# Patient Record
Sex: MALE | Race: WHITE | NOT HISPANIC OR LATINO | Employment: UNEMPLOYED | URBAN - NONMETROPOLITAN AREA
[De-identification: names, ages, dates, MRNs, and addresses within clinical notes are randomized per-mention and may not be internally consistent; named-entity substitution may affect disease eponyms.]

---

## 2017-12-31 ENCOUNTER — HOSPITAL ENCOUNTER (EMERGENCY)
Facility: HOSPITAL | Age: 32
Discharge: HOME | End: 2017-12-31
Attending: FAMILY MEDICINE | Admitting: EMERGENCY MEDICINE

## 2017-12-31 VITALS
BODY MASS INDEX: 28.7 KG/M2 | OXYGEN SATURATION: 99 % | RESPIRATION RATE: 18 BRPM | SYSTOLIC BLOOD PRESSURE: 131 MMHG | HEIGHT: 71 IN | TEMPERATURE: 98.2 F | DIASTOLIC BLOOD PRESSURE: 73 MMHG | HEART RATE: 82 BPM | WEIGHT: 205 LBS

## 2017-12-31 DIAGNOSIS — L25.5 DERMATITIS DUE TO PLANTS, INCLUDING POISON IVY, SUMAC, AND OAK: Primary | ICD-10-CM

## 2017-12-31 PROCEDURE — 25010000002 TRIAMCINOLONE PER 10 MG: Performed by: PHYSICIAN ASSISTANT

## 2017-12-31 PROCEDURE — 96372 THER/PROPH/DIAG INJ SC/IM: CPT

## 2017-12-31 PROCEDURE — 99282 EMERGENCY DEPT VISIT SF MDM: CPT

## 2017-12-31 RX ORDER — TRIAMCINOLONE ACETONIDE 40 MG/ML
40 INJECTION, SUSPENSION INTRA-ARTICULAR; INTRAMUSCULAR ONCE
Status: COMPLETED | OUTPATIENT
Start: 2017-12-31 | End: 2017-12-31

## 2017-12-31 RX ORDER — HYDROXYZINE HYDROCHLORIDE 25 MG/1
25 TABLET, FILM COATED ORAL 3 TIMES DAILY PRN
Qty: 21 TABLET | Refills: 0 | Status: SHIPPED | OUTPATIENT
Start: 2017-12-31 | End: 2018-01-07

## 2017-12-31 RX ADMIN — TRIAMCINOLONE ACETONIDE 40 MG: 40 INJECTION, SUSPENSION INTRA-ARTICULAR; INTRAMUSCULAR at 19:50

## 2018-01-01 NOTE — ED PROVIDER NOTES
Subjective   HPI Comments: Patient presents to emergency department for rash x 3 days.  States he cuts wood and is sure that is is poison oak.  States it is on his left neck and all around groin.  States it itches intensely and has spread to where he has itched lesions.  States he received a steroid injection last time he had it which resolved symptoms.        Patient is a 32 y.o. male presenting with rash.   History provided by:  Patient   used: No    Rash   Location:  Head/neck  Head/neck rash location:  Head and L neck  Quality: blistering, itchiness and weeping    Severity:  Mild  Onset quality:  Sudden  Duration:  3 days  Timing:  Constant  Progression:  Spreading  Chronicity:  Recurrent  Context: plant contact    Relieved by:  Nothing  Worsened by:  Heat  Ineffective treatments:  None tried  Associated symptoms: no abdominal pain, no diarrhea, no fever, no headaches, no joint pain, no nausea, no shortness of breath and not vomiting        Review of Systems   Constitutional: Negative for chills and fever.   Respiratory: Negative for chest tightness and shortness of breath.    Cardiovascular: Negative for chest pain.   Gastrointestinal: Negative for abdominal pain, constipation, diarrhea, nausea and vomiting.   Endocrine: Negative for polyuria.   Genitourinary: Negative for decreased urine volume, difficulty urinating, dysuria, flank pain, frequency and hematuria.   Musculoskeletal: Negative for arthralgias, back pain and neck pain.   Skin: Positive for rash. Negative for color change, pallor and wound.   Allergic/Immunologic: Negative for immunocompromised state.   Neurological: Negative for dizziness, syncope, weakness and headaches.   Hematological: Does not bruise/bleed easily.   Psychiatric/Behavioral: Negative for confusion, self-injury and suicidal ideas. The patient is not nervous/anxious.        No past medical history on file.    No Known Allergies    No past surgical history on  "file.    No family history on file.    Social History     Social History   • Marital status:      Spouse name: N/A   • Number of children: N/A   • Years of education: N/A     Social History Main Topics   • Smoking status: Not on file   • Smokeless tobacco: Not on file   • Alcohol use Not on file   • Drug use: Not on file   • Sexual activity: Not on file     Other Topics Concern   • Not on file     Social History Narrative           Objective      /73 (BP Location: Right arm, Patient Position: Sitting)  Pulse 82  Temp 98.2 °F (36.8 °C) (Oral)   Resp 18  Ht 180.3 cm (71\")  Wt 93 kg (205 lb)  SpO2 99%  BMI 28.59 kg/m2    Physical Exam   Constitutional: He is oriented to person, place, and time. He appears well-developed and well-nourished. No distress.   Cardiovascular: Normal rate, regular rhythm and normal heart sounds.    Pulmonary/Chest: Effort normal and breath sounds normal.   Abdominal: Soft. Bowel sounds are normal.   Neurological: He is alert and oriented to person, place, and time.   Skin: Skin is warm and dry.   Erythematous maculopapular rash with excoriations on scalp, left neck, and groin/inner thigh.   Psychiatric: He has a normal mood and affect. His behavior is normal. Thought content normal.   Nursing note and vitals reviewed.      Procedures         ED Course  ED Course                  MDM    Final diagnoses:   Dermatitis due to plants, including poison ivy, sumac, and oak            Mathew Castillo PA-C  01/01/18 1222    "

## 2018-01-29 ENCOUNTER — OFFICE VISIT (OUTPATIENT)
Dept: PODIATRY | Facility: CLINIC | Age: 33
End: 2018-01-29

## 2018-01-29 VITALS
SYSTOLIC BLOOD PRESSURE: 125 MMHG | HEART RATE: 73 BPM | HEIGHT: 71 IN | BODY MASS INDEX: 28.53 KG/M2 | WEIGHT: 203.8 LBS | DIASTOLIC BLOOD PRESSURE: 76 MMHG | OXYGEN SATURATION: 96 %

## 2018-01-29 DIAGNOSIS — M79.671 FOOT PAIN, RIGHT: Primary | ICD-10-CM

## 2018-01-29 DIAGNOSIS — M19.171 POST-TRAUMATIC OSTEOARTHRITIS OF RIGHT FOOT: ICD-10-CM

## 2018-01-29 DIAGNOSIS — S92.011S CLOSED DISPLACED FRACTURE OF BODY OF RIGHT CALCANEUS, SEQUELA: ICD-10-CM

## 2018-01-29 PROCEDURE — 99204 OFFICE O/P NEW MOD 45 MIN: CPT | Performed by: PODIATRIST

## 2018-01-29 RX ORDER — GABAPENTIN 300 MG/1
300 CAPSULE ORAL 2 TIMES DAILY
Qty: 60 CAPSULE | Refills: 0 | Status: SHIPPED | OUTPATIENT
Start: 2018-01-29 | End: 2018-02-26 | Stop reason: SDUPTHER

## 2018-01-29 RX ORDER — MELOXICAM 15 MG/1
15 TABLET ORAL DAILY
Qty: 30 TABLET | Refills: 0 | Status: SHIPPED | OUTPATIENT
Start: 2018-01-29 | End: 2018-02-26 | Stop reason: SDUPTHER

## 2018-01-29 NOTE — PROGRESS NOTES
Devaughn Rodriguez  1985  32 y.o. male   Patient presents today for right foot pain and numbness.    01/29/2018    Chief Complaint   Patient presents with   • Right Foot - Pain           History of Present Illness    Devaughn Rodriguez is a 32 y.o.male who presents to clinic with chief complaint of right foot pain.  Patient states that approximately 1 year ago she fell off a roof and fractured his right calcaneus.  He was in [place] when this happened.  He was treated nonoperatively by a physician in [place].  He relates to continued pain to his right foot.  He currently rates as a 5 out of 10.  He describes the pain as a sharp.  He also relates to numbness to the inside of his right foot.  He is currently ambulating in boots with moderate discomfort.  He takes over-the-counter Advil liquid gels for the pain.  He has no other pedal complaints.          History reviewed. No pertinent past medical history.      History reviewed. No pertinent surgical history.      Family History   Problem Relation Age of Onset   • No Known Problems Mother    • No Known Problems Father    • No Known Problems Brother    • Cancer Paternal Grandmother    • Cancer Paternal Grandfather        No Known Allergies    Social History     Social History   • Marital status:      Spouse name: N/A   • Number of children: N/A   • Years of education: N/A     Occupational History   • Not on file.     Social History Main Topics   • Smoking status: Former Smoker     Types: Cigarettes   • Smokeless tobacco: Former User     Types: Chew   • Alcohol use No   • Drug use: No   • Sexual activity: Not on file     Other Topics Concern   • Not on file     Social History Narrative   • No narrative on file         Current Outpatient Prescriptions   Medication Sig Dispense Refill   • gabapentin (NEURONTIN) 300 MG capsule Take 1 capsule by mouth 2 (Two) Times a Day. 60 capsule 0   • meloxicam (MOBIC) 15 MG tablet Take 1 tablet by mouth Daily. Take once  "daily. 30 tablet 0     No current facility-administered medications for this visit.          OBJECTIVE    /76  Pulse 73  Ht 180.3 cm (70.98\")  Wt 92.4 kg (203 lb 12.8 oz)  SpO2 96%  BMI 28.44 kg/m2      Review of Systems   Constitutional: Negative.    HENT: Negative.    Eyes: Negative.    Respiratory: Negative.    Cardiovascular: Negative.    Gastrointestinal: Negative.    Endocrine: Negative.    Genitourinary: Negative.    Musculoskeletal:        Foot pain, ankle pain   Skin: Negative.    Allergic/Immunologic: Negative.    Neurological: Negative.    Hematological: Negative.    Psychiatric/Behavioral: Negative.          Constitutional: well developed, well nourished    HEENT: Normocephalic and atraumatic, normal hearing    Respiratory: Non labored respirations noted    Right lower extremity exam    Cardiovascular:    DP/PT pulses palpable    CFT brisk  to all digits  Skin temp is warm to warm from proximal tibia to distal digits  Pedal hair growth present.   No erythema  noted   Nonpitting edema noted to the right lower extremity    Musculoskeletal:  Muscle strength is 5/5 for plantar flexors and inverters on the right and 4 out of 5 for dorsiflexors and everters on the right.  ROM of the 1st MTP is full without pain or crepitus  ROM of the MTJ is full without pain or crepitus    ROM of the STJ is decreased with pain and crepitus     ROM of the ankle joint is full without pain or crepitus    Pain on palpation to the sinus tarsi  Hindfoot inversion noted    Dermatological:   Skin is warm, dry and intact    Webspaces 1-4 bilateral are clean, dry and intact.   No subcutaneous nodules or masses noted    No open wounds noted     Neurological:   Protective sensation decreased to the great toe and medial arch  Sensation intact to light touch    DTR intact    Psychiatric: A&O x 3 with normal mood and affect. NAD.     Radiographs: 3 views of the right foot were obtained today.  Healing/healed calcaneal " intra-articular calcaneal fracture noted.  Significant joint degeneration noted to the subtalar joint. Decrease to bohlers angle.         Procedures        ASSESSMENT AND PLAN    Devaughn was seen today for pain.    Diagnoses and all orders for this visit:    Foot pain, right  -     XR Foot 3+ View Right    Post-traumatic osteoarthritis of right foot    Closed displaced fracture of body of right calcaneus, sequela    Other orders  -     meloxicam (MOBIC) 15 MG tablet; Take 1 tablet by mouth Daily. Take once daily.  -     gabapentin (NEURONTIN) 300 MG capsule; Take 1 capsule by mouth 2 (Two) Times a Day.    - Comprehensive foot and ankle exam performed.   - X-rays taken and reviewed  - Lengthy discussion was held patient regarding conservative and surgical treatment for his right foot pain.  - Rx for Mobic and gabapentin  - Dispensed lace up ankle brace  - Patient is aware that he will likely need primary subtalar joint arthrodesis in the future  - He can work as tolerated. He will likely have increased pain with certain activities like climbing ladders or hopping up and down out of truck beds.   - All questions were answered to the patients satisfaction.  - RTC 4 weeks    I spent 45 minutes face-to-face with this patient discussing his complaint and potential treatment options.          This document has been electronically signed by Junaid Avery DPM on January 29, 2018 6:07 PM     1/29/2018  6:07 PM

## 2018-02-09 ENCOUNTER — HOSPITAL ENCOUNTER (EMERGENCY)
Facility: HOSPITAL | Age: 33
Discharge: HOME | End: 2018-02-09
Attending: EMERGENCY MEDICINE | Admitting: EMERGENCY MEDICINE

## 2018-02-09 VITALS
RESPIRATION RATE: 18 BRPM | TEMPERATURE: 98.1 F | HEIGHT: 71 IN | WEIGHT: 205 LBS | SYSTOLIC BLOOD PRESSURE: 159 MMHG | DIASTOLIC BLOOD PRESSURE: 77 MMHG | OXYGEN SATURATION: 97 % | HEART RATE: 74 BPM | BODY MASS INDEX: 28.7 KG/M2

## 2018-02-09 DIAGNOSIS — J01.00 ACUTE NON-RECURRENT MAXILLARY SINUSITIS: Primary | ICD-10-CM

## 2018-02-09 DIAGNOSIS — H65.03 BILATERAL ACUTE SEROUS OTITIS MEDIA, RECURRENCE NOT SPECIFIED: ICD-10-CM

## 2018-02-09 PROCEDURE — 99283 EMERGENCY DEPT VISIT LOW MDM: CPT

## 2018-02-09 RX ORDER — CEFUROXIME AXETIL 500 MG/1
500 TABLET ORAL ONCE
Status: COMPLETED | OUTPATIENT
Start: 2018-02-09 | End: 2018-02-09

## 2018-02-09 RX ORDER — CETIRIZINE HYDROCHLORIDE, PSEUDOEPHEDRINE HYDROCHLORIDE 5; 120 MG/1; MG/1
1 TABLET, FILM COATED, EXTENDED RELEASE ORAL 2 TIMES DAILY PRN
Qty: 14 TABLET | Refills: 0 | Status: SHIPPED | OUTPATIENT
Start: 2018-02-09 | End: 2018-07-23

## 2018-02-09 RX ORDER — CEFPROZIL 500 MG/1
500 TABLET, FILM COATED ORAL 2 TIMES DAILY
Qty: 20 TABLET | Refills: 0 | Status: SHIPPED | OUTPATIENT
Start: 2018-02-09 | End: 2018-02-19

## 2018-02-09 RX ADMIN — CEFUROXIME AXETIL 500 MG: 500 TABLET ORAL at 22:10

## 2018-02-10 NOTE — ED PROVIDER NOTES
Subjective   Patient is a 32 y.o. male presenting with URI.   URI   Presenting symptoms: congestion, ear pain, fever, rhinorrhea and sore throat    Severity:  Moderate  Onset quality:  Gradual  Duration:  1 week  Timing:  Constant  Progression:  Worsening  Chronicity:  New  Relieved by:  Nothing  Worsened by:  Nothing  Ineffective treatments:  None tried  Associated symptoms: headaches and sinus pain    Risk factors: no immunosuppression        Review of Systems   Constitutional: Positive for fever.   HENT: Positive for congestion, ear pain, rhinorrhea, sinus pain and sore throat.    Neurological: Positive for headaches.   All other systems reviewed and are negative.      History reviewed. No pertinent past medical history.    No Known Allergies    History reviewed. No pertinent surgical history.    Family History   Problem Relation Age of Onset   • No Known Problems Mother    • No Known Problems Father    • No Known Problems Brother    • Cancer Paternal Grandmother    • Cancer Paternal Grandfather        Social History     Social History   • Marital status:      Spouse name: N/A   • Number of children: N/A   • Years of education: N/A     Social History Main Topics   • Smoking status: Former Smoker     Types: Cigarettes   • Smokeless tobacco: Former User     Types: Chew   • Alcohol use No   • Drug use: No   • Sexual activity: Not Asked     Other Topics Concern   • None     Social History Narrative           Objective   Physical Exam   Constitutional: He is oriented to person, place, and time. He appears well-developed and well-nourished.   HENT:   Head: Normocephalic and atraumatic.   Right Ear: A middle ear effusion is present.   Left Ear: A middle ear effusion is present.   Nose: Rhinorrhea present. Right sinus exhibits maxillary sinus tenderness.   Eyes: Conjunctivae and EOM are normal. Pupils are equal, round, and reactive to light.   Neck: Neck supple. No JVD present. No tracheal deviation present.    Cardiovascular: Normal rate, regular rhythm, normal heart sounds and intact distal pulses.  Exam reveals no gallop and no friction rub.    No murmur heard.  Pulmonary/Chest: Effort normal and breath sounds normal. He has no wheezes. He has no rales.   Abdominal: Soft. Bowel sounds are normal. There is no tenderness. There is no rebound and no guarding.   Lymphadenopathy:     He has no cervical adenopathy.   Neurological: He is alert and oriented to person, place, and time.   Skin: Skin is warm and dry.   Nursing note and vitals reviewed.      Procedures         ED Course  ED Course                  MDM    Final diagnoses:   Acute non-recurrent maxillary sinusitis   Bilateral acute serous otitis media, recurrence not specified            Ralph Shelton MD  02/09/18 1963

## 2018-02-10 NOTE — DISCHARGE INSTRUCTIONS
Return ED fever, vomiting, worse condition, other concerns    Hypertension  Hypertension, commonly called high blood pressure, is when the force of blood pumping through your arteries is too strong. Your arteries are the blood vessels that carry blood from your heart throughout your body. A blood pressure reading consists of a higher number over a lower number, such as 110/72. The higher number (systolic) is the pressure inside your arteries when your heart pumps. The lower number (diastolic) is the pressure inside your arteries when your heart relaxes. Ideally you want your blood pressure below 120/80.  Hypertension forces your heart to work harder to pump blood. Your arteries may become narrow or stiff. Having untreated or uncontrolled hypertension can cause heart attack, stroke, kidney disease, and other problems.  What increases the risk?  Some risk factors for high blood pressure are controllable. Others are not.  Risk factors you cannot control include:  · Race. You may be at higher risk if you are .  · Age. Risk increases with age.  · Gender. Men are at higher risk than women before age 45 years. After age 65, women are at higher risk than men.  Risk factors you can control include:  · Not getting enough exercise or physical activity.  · Being overweight.  · Getting too much fat, sugar, calories, or salt in your diet.  · Drinking too much alcohol.  What are the signs or symptoms?  Hypertension does not usually cause signs or symptoms. Extremely high blood pressure (hypertensive crisis) may cause headache, anxiety, shortness of breath, and nosebleed.  How is this diagnosed?  To check if you have hypertension, your health care provider will measure your blood pressure while you are seated, with your arm held at the level of your heart. It should be measured at least twice using the same arm. Certain conditions can cause a difference in blood pressure between your right and left arms. A blood  pressure reading that is higher than normal on one occasion does not mean that you need treatment. If it is not clear whether you have high blood pressure, you may be asked to return on a different day to have your blood pressure checked again. Or, you may be asked to monitor your blood pressure at home for 1 or more weeks.  How is this treated?  Treating high blood pressure includes making lifestyle changes and possibly taking medicine. Living a healthy lifestyle can help lower high blood pressure. You may need to change some of your habits.  Lifestyle changes may include:  · Following the DASH diet. This diet is high in fruits, vegetables, and whole grains. It is low in salt, red meat, and added sugars.  · Keep your sodium intake below 2,300 mg per day.  · Getting at least 30-45 minutes of aerobic exercise at least 4 times per week.  · Losing weight if necessary.  · Not smoking.  · Limiting alcoholic beverages.  · Learning ways to reduce stress.  Your health care provider may prescribe medicine if lifestyle changes are not enough to get your blood pressure under control, and if one of the following is true:  · You are 18-59 years of age and your systolic blood pressure is above 140.  · You are 60 years of age or older, and your systolic blood pressure is above 150.  · Your diastolic blood pressure is above 90.  · You have diabetes, and your systolic blood pressure is over 140 or your diastolic blood pressure is over 90.  · You have kidney disease and your blood pressure is above 140/90.  · You have heart disease and your blood pressure is above 140/90.  Your personal target blood pressure may vary depending on your medical conditions, your age, and other factors.  Follow these instructions at home:  · Have your blood pressure rechecked as directed by your health care provider.  · Take medicines only as directed by your health care provider. Follow the directions carefully. Blood pressure medicines must be taken as  prescribed. The medicine does not work as well when you skip doses. Skipping doses also puts you at risk for problems.  · Do not smoke.  · Monitor your blood pressure at home as directed by your health care provider.  Contact a health care provider if:  · You think you are having a reaction to medicines taken.  · You have recurrent headaches or feel dizzy.  · You have swelling in your ankles.  · You have trouble with your vision.  Get help right away if:  · You develop a severe headache or confusion.  · You have unusual weakness, numbness, or feel faint.  · You have severe chest or abdominal pain.  · You vomit repeatedly.  · You have trouble breathing.  This information is not intended to replace advice given to you by your health care provider. Make sure you discuss any questions you have with your health care provider.  Document Released: 12/18/2006 Document Revised: 05/25/2017 Document Reviewed: 10/10/2014  [employer] Patient Education © 2017 [employer]

## 2018-02-12 ENCOUNTER — TELEPHONE (OUTPATIENT)
Dept: FAMILY MEDICINE CLINIC | Facility: CLINIC | Age: 33
End: 2018-02-12

## 2018-02-26 ENCOUNTER — OFFICE VISIT (OUTPATIENT)
Dept: PODIATRY | Facility: CLINIC | Age: 33
End: 2018-02-26

## 2018-02-26 VITALS — HEART RATE: 91 BPM | WEIGHT: 205 LBS | HEIGHT: 71 IN | BODY MASS INDEX: 28.7 KG/M2 | OXYGEN SATURATION: 98 %

## 2018-02-26 DIAGNOSIS — M19.171 POST-TRAUMATIC OSTEOARTHRITIS OF RIGHT FOOT: ICD-10-CM

## 2018-02-26 DIAGNOSIS — S92.011S CLOSED DISPLACED FRACTURE OF BODY OF RIGHT CALCANEUS, SEQUELA: Primary | ICD-10-CM

## 2018-02-26 PROCEDURE — 20605 DRAIN/INJ JOINT/BURSA W/O US: CPT | Performed by: PODIATRIST

## 2018-02-26 PROCEDURE — 99213 OFFICE O/P EST LOW 20 MIN: CPT | Performed by: PODIATRIST

## 2018-02-26 RX ORDER — GABAPENTIN 300 MG/1
300 CAPSULE ORAL 2 TIMES DAILY
Qty: 60 CAPSULE | Refills: 0 | Status: SHIPPED | OUTPATIENT
Start: 2018-02-26 | End: 2018-02-26 | Stop reason: SDUPTHER

## 2018-02-26 RX ORDER — GABAPENTIN 300 MG/1
300 CAPSULE ORAL 2 TIMES DAILY
Qty: 60 CAPSULE | Refills: 0 | Status: SHIPPED | OUTPATIENT
Start: 2018-02-26 | End: 2018-07-23

## 2018-02-26 RX ORDER — DEXAMETHASONE SODIUM PHOSPHATE 4 MG/ML
4 INJECTION, SOLUTION INTRA-ARTICULAR; INTRALESIONAL; INTRAMUSCULAR; INTRAVENOUS; SOFT TISSUE ONCE
Status: COMPLETED | OUTPATIENT
Start: 2018-02-26 | End: 2018-02-26

## 2018-02-26 RX ORDER — BUPIVACAINE HYDROCHLORIDE 5 MG/ML
1.5 INJECTION, SOLUTION EPIDURAL; INTRACAUDAL ONCE
Status: COMPLETED | OUTPATIENT
Start: 2018-02-26 | End: 2018-02-26

## 2018-02-26 RX ORDER — MELOXICAM 15 MG/1
15 TABLET ORAL DAILY
Qty: 30 TABLET | Refills: 0 | Status: SHIPPED | OUTPATIENT
Start: 2018-02-26 | End: 2018-03-24 | Stop reason: SDUPTHER

## 2018-02-26 RX ADMIN — DEXAMETHASONE SODIUM PHOSPHATE 4 MG: 4 INJECTION, SOLUTION INTRA-ARTICULAR; INTRALESIONAL; INTRAMUSCULAR; INTRAVENOUS; SOFT TISSUE at 14:43

## 2018-02-26 RX ADMIN — BUPIVACAINE HYDROCHLORIDE 1.5 ML: 5 INJECTION, SOLUTION EPIDURAL; INTRACAUDAL at 14:43

## 2018-02-26 NOTE — PROGRESS NOTES
Devaughn Hernández Igorleon  1985  32 y.o. male   Patient presents today for right foot pain and numbness.    02/26/2018      Chief Complaint   Patient presents with   • Right Foot - Follow-up           History of Present Illness    Patient presents to clinic today for follow-up of his right foot pain and numbness.  He states that the gabapentin has significantly reduced his nerve pain.  He has been wearing his lace up ankle brace which helps some.  He continues to have right foot pain aggravated with prolonged weightbearing.  Currently rates as a 4 out of 10.  He is interested in discussing surgical intervention.  He has no other pedal complaints.        Past Medical History:   Diagnosis Date   • Closed displaced fracture of body of right calcaneus with routine healing    • Foot pain, right    • Post-traumatic osteoarthritis of right foot          History reviewed. No pertinent surgical history.      Family History   Problem Relation Age of Onset   • No Known Problems Mother    • No Known Problems Father    • No Known Problems Brother    • Cancer Paternal Grandmother    • Cancer Paternal Grandfather        No Known Allergies    Social History     Social History   • Marital status:      Spouse name: N/A   • Number of children: N/A   • Years of education: N/A     Occupational History   • Not on file.     Social History Main Topics   • Smoking status: Former Smoker     Types: Cigarettes   • Smokeless tobacco: Former User     Types: Chew   • Alcohol use No   • Drug use: No   • Sexual activity: Not on file     Other Topics Concern   • Not on file     Social History Narrative         Current Outpatient Prescriptions   Medication Sig Dispense Refill   • cetirizine-pseudoephedrine (ZyrTEC-D) 5-120 MG per 12 hr tablet Take 1 tablet by mouth 2 (Two) Times a Day As Needed for Rhinitis. 14 tablet 0   • gabapentin (NEURONTIN) 300 MG capsule Take 1 capsule by mouth 2 (Two) Times a Day. 60 capsule 0   • meloxicam (MOBIC) 15  "MG tablet Take 1 tablet by mouth Daily. Take once daily. 30 tablet 0     No current facility-administered medications for this visit.          OBJECTIVE    Pulse 91  Ht 180.3 cm (71\")  Wt 93 kg (205 lb)  SpO2 98%  BMI 28.59 kg/m2      Review of Systems   Constitutional: Negative.  Negative for activity change.   HENT: Negative.  Negative for congestion.    Eyes: Negative.  Negative for discharge.   Respiratory: Negative.  Negative for apnea.    Cardiovascular: Negative.  Negative for chest pain.   Gastrointestinal: Negative.  Negative for abdominal distention.   Endocrine: Negative.  Negative for cold intolerance.   Genitourinary: Negative.  Negative for difficulty urinating.   Musculoskeletal:        Right foot pain  Ankle pain   Skin: Negative.  Negative for color change.   Allergic/Immunologic: Negative.  Negative for environmental allergies.   Neurological: Negative.  Negative for dizziness.   Hematological: Negative.  Negative for adenopathy.   Psychiatric/Behavioral: Negative.  Negative for agitation.         Constitutional: well developed, well nourished    HEENT: Normocephalic and atraumatic, normal hearing    Respiratory: Non labored respirations noted    Right lower extremity exam    Cardiovascular:    DP/PT pulses palpable    CFT brisk  to all digits  Skin temp is warm to warm from proximal tibia to distal digits  Pedal hair growth present.   No erythema  noted   Nonpitting edema noted to the right lower extremity    Musculoskeletal:  Muscle strength is 5/5 for plantar flexors and inverters on the right and 4 out of 5 for dorsiflexors and everters on the right.  ROM of the 1st MTP is full without pain or crepitus  ROM of the MTJ is full without pain or crepitus    ROM of the STJ is decreased with pain and crepitus     ROM of the ankle joint is full without pain or crepitus    Pain on palpation to the sinus tarsi  Hindfoot inversion noted    Dermatological:   Skin is warm, dry and intact    Webspaces " 1-4 bilateral are clean, dry and intact.   No subcutaneous nodules or masses noted    No open wounds noted     Neurological:   Protective sensation decreased to the great toe and medial arch  Sensation intact to light touch    DTR intact    Psychiatric: A&O x 3 with normal mood and affect. NAD.        Subtalar Joint Injection   Date/Time: 2/26/2018 3:30 PM  Consent given by: patient  Site marked: site marked  Timeout: Immediately prior to procedure a time out was called to verify the correct patient, procedure, equipment, support staff and site/side marked as required   Supporting Documentation  Indications: pain and diagnostic evaluation   Procedure Details  Location: subtalar joint.  Preparation: Patient was prepped and draped in the usual sterile fashion  Needle size: 25 G  Approach: anterolateral  Medication group details: Decadron 4 mg/mL, half percent Marcaine plain.  Patient tolerance: patient tolerated the procedure well with no immediate complications              ASSESSMENT AND PLAN    Devaughn was seen today for follow-up.    Diagnoses and all orders for this visit:    Closed displaced fracture of body of right calcaneus, sequela  -     XR Calcaneus 2+ View Right    Post-traumatic osteoarthritis of right foot  -     bupivacaine (PF) (MARCAINE) 0.5 % injection 1.5 mL; Inject 1.5 mL as directed 1 (One) Time.  -     dexamethasone (DECADRON) injection 4 mg; Inject 1 mL into the shoulder, thigh, or buttocks 1 (One) Time.    Other orders  -     meloxicam (MOBIC) 15 MG tablet; Take 1 tablet by mouth Daily. Take once daily.  -     Discontinue: gabapentin (NEURONTIN) 300 MG capsule; Take 1 capsule by mouth 2 (Two) Times a Day.  -     gabapentin (NEURONTIN) 300 MG capsule; Take 1 capsule by mouth 2 (Two) Times a Day.    - Discussion held patient regarding continued conservative care versus surgical intervention.  Patient is interested in pursuing surgical intervention.  - Diagnostic subtalar joint injection  performed.  Patient had immediate relief of symptoms.  - Refill mobile and gabapentin  - He can work as tolerated. He will likely have increased pain with certain activities like climbing ladders or hopping up and down out of truck beds.   - All his questions were answered.  - Return to clinic in 3 weeks          This document has been electronically signed by Junaid Avery DPM on February 26, 2018 3:29 PM     2/26/2018  3:29 PM

## 2018-03-26 RX ORDER — MELOXICAM 15 MG/1
TABLET ORAL
Qty: 30 TABLET | Refills: 0 | Status: SHIPPED | OUTPATIENT
Start: 2018-03-26 | End: 2018-04-10 | Stop reason: SDUPTHER

## 2018-04-10 ENCOUNTER — TELEPHONE (OUTPATIENT)
Dept: PODIATRY | Facility: CLINIC | Age: 33
End: 2018-04-10

## 2018-04-10 ENCOUNTER — OFFICE VISIT (OUTPATIENT)
Dept: PODIATRY | Facility: CLINIC | Age: 33
End: 2018-04-10

## 2018-04-10 VITALS — WEIGHT: 205 LBS | OXYGEN SATURATION: 98 % | HEART RATE: 70 BPM | HEIGHT: 71 IN | BODY MASS INDEX: 28.7 KG/M2

## 2018-04-10 DIAGNOSIS — M19.171 POST-TRAUMATIC OSTEOARTHRITIS OF RIGHT FOOT: ICD-10-CM

## 2018-04-10 DIAGNOSIS — S92.011S CLOSED DISPLACED FRACTURE OF BODY OF RIGHT CALCANEUS, SEQUELA: Primary | ICD-10-CM

## 2018-04-10 PROCEDURE — 99214 OFFICE O/P EST MOD 30 MIN: CPT | Performed by: PODIATRIST

## 2018-04-10 RX ORDER — BUPIVACAINE HCL/0.9 % NACL/PF 0.1 %
2 PLASTIC BAG, INJECTION (ML) EPIDURAL ONCE
Status: CANCELLED | OUTPATIENT
Start: 2018-07-26 | End: 2018-07-26

## 2018-04-10 RX ORDER — MELOXICAM 15 MG/1
15 TABLET ORAL DAILY
Qty: 30 TABLET | Refills: 0 | Status: SHIPPED | OUTPATIENT
Start: 2018-04-10 | End: 2018-05-15 | Stop reason: SDUPTHER

## 2018-04-10 NOTE — PROGRESS NOTES
Devaughn Hernández Michael  1985  32 y.o. male     Patient presents today for recheck of his right foot pain.    04/10/2018       Chief Complaint   Patient presents with   • Right Foot - Follow-up       History of Present Illness    Patient presents to clinic today for follow-up. He had diagnostic subtalar joint injection on his last visit for posttraumatic arthritis of his subtalar joint.  Patient states that he had 75% relief for approximately 2 weeks after the steroid injection.  He states the pain has returned.  Currently he rates it as a 3 out of 10.  Pain is aggravated with prolonged weightbearing and relieved with rest.  He has no other pedal complaints.      Past Medical History:   Diagnosis Date   • Closed displaced fracture of body of right calcaneus with routine healing    • Foot pain, right    • Post-traumatic osteoarthritis of right foot          History reviewed. No pertinent surgical history.      Family History   Problem Relation Age of Onset   • No Known Problems Mother    • No Known Problems Father    • No Known Problems Brother    • Cancer Paternal Grandmother    • Cancer Paternal Grandfather        No Known Allergies    Social History     Social History   • Marital status:      Spouse name: N/A   • Number of children: N/A   • Years of education: N/A     Occupational History   • Not on file.     Social History Main Topics   • Smoking status: Former Smoker     Types: Cigarettes   • Smokeless tobacco: Former User     Types: Chew   • Alcohol use No   • Drug use: No   • Sexual activity: Defer     Other Topics Concern   • Not on file     Social History Narrative   • No narrative on file         Current Outpatient Prescriptions   Medication Sig Dispense Refill   • cetirizine-pseudoephedrine (ZyrTEC-D) 5-120 MG per 12 hr tablet Take 1 tablet by mouth 2 (Two) Times a Day As Needed for Rhinitis. 14 tablet 0   • gabapentin (NEURONTIN) 300 MG capsule Take 1 capsule by mouth 2 (Two) Times a Day. 60  "capsule 0   • meloxicam (MOBIC) 15 MG tablet Take 1 tablet by mouth Daily. 30 tablet 0     No current facility-administered medications for this visit.          OBJECTIVE    Pulse 70   Ht 180.3 cm (71\")   Wt 93 kg (205 lb)   SpO2 98%   BMI 28.59 kg/m²       Review of Systems   Constitutional: Negative.    HENT: Negative.    Respiratory: Negative.    Cardiovascular: Negative.    Gastrointestinal: Negative.    Musculoskeletal:        Right foot pain     Skin: Negative.    Neurological: Positive for numbness.   Psychiatric/Behavioral: Negative.          Constitutional: well developed, well nourished    HEENT: Normocephalic and atraumatic, normal hearing    Respiratory: Non labored respirations noted    Right lower extremity exam    Cardiovascular:    DP/PT pulses palpable    Pedal hair growth present.  Nonpitting edema noted to the right lower extremity    Musculoskeletal:  Muscle strength is 5/5 for plantar flexors and inverters on the right and 4 out of 5 for dorsiflexors and everters on the right.  ROM of the 1st MTP is full without pain or crepitus  ROM of the MTJ is full without pain or crepitus    ROM of the STJ is decreased with pain and crepitus     ROM of the ankle joint is full without pain or crepitus    Pain on palpation to the sinus tarsi  Hindfoot inversion noted    Dermatological:   Skin is warm, dry and intact    Webspaces 1-4 bilateral are clean, dry and intact.   No subcutaneous nodules or masses noted    No open wounds noted     Neurological:   Protective sensation decreased to the great toe and medial arch  Sensation intact to light touch    DTR intact    Psychiatric: A&O x 3 with normal mood and affect. NAD.        Procedures        ASSESSMENT AND PLAN    Devaughn was seen today for follow-up.    Diagnoses and all orders for this visit:    Closed displaced fracture of body of right calcaneus, sequela  -     Case Request; Standing  -     ceFAZolin (ANCEF) 2 g in sodium chloride 0.9 % 100 mL IVPB; " Infuse 2 g into a venous catheter 1 (One) Time.  -     Case Request    Post-traumatic osteoarthritis of right foot  -     Case Request; Standing  -     ceFAZolin (ANCEF) 2 g in sodium chloride 0.9 % 100 mL IVPB; Infuse 2 g into a venous catheter 1 (One) Time.  -     Case Request    Other orders  -     meloxicam (MOBIC) 15 MG tablet; Take 1 tablet by mouth Daily.  -     Follow Anesthesia Guidelines / Standing Orders; Standing  -     Verify NPO Status; Standing  -     Obtain informed consent (if not collected inpatient or PAT); Standing  -     Notify Physician - Standard; Standing      - Lengthy discussion was held patient regarding continued conservative care versus surgical intervention.  Patient has elected to pursue surgical intervention at this time.  Patient will like to schedule surgery for July.  - Refill Mobic  - He can work as tolerated. He will likely have increased pain with certain activities like climbing ladders or hopping up and down out of truck beds.    - All his questions were answered.  - Return to clinic in 2 months          This document has been electronically signed by Junaid Avery DPM on April 10, 2018 2:24 PM     4/10/2018  2:24 PM

## 2018-05-02 ENCOUNTER — TELEPHONE (OUTPATIENT)
Dept: PODIATRY | Facility: CLINIC | Age: 33
End: 2018-05-02

## 2018-05-02 NOTE — TELEPHONE ENCOUNTER
CALLED.  SAYS SHE NEEDS THE COMPLETED NCC AND WORK STATUS AFTER THE FOLLOWING DATES:    1/29, 2/26, 3/29 AND 4/10.    HER PHONE NUMBER [phone]    HER FAX NUMBER [phone]    THANKS.

## 2018-05-13 ENCOUNTER — HOSPITAL ENCOUNTER (EMERGENCY)
Facility: HOSPITAL | Age: 33
Discharge: HOME | End: 2018-05-13
Attending: EMERGENCY MEDICINE | Admitting: EMERGENCY MEDICINE

## 2018-05-13 VITALS
TEMPERATURE: 98.2 F | DIASTOLIC BLOOD PRESSURE: 70 MMHG | SYSTOLIC BLOOD PRESSURE: 122 MMHG | OXYGEN SATURATION: 99 % | BODY MASS INDEX: 30.84 KG/M2 | HEART RATE: 70 BPM | RESPIRATION RATE: 16 BRPM | HEIGHT: 71 IN | WEIGHT: 220.31 LBS

## 2018-05-13 DIAGNOSIS — L23.7 ALLERGIC CONTACT DERMATITIS DUE TO PLANTS, EXCEPT FOOD: Primary | ICD-10-CM

## 2018-05-13 PROCEDURE — 99283 EMERGENCY DEPT VISIT LOW MDM: CPT

## 2018-05-13 PROCEDURE — 63710000001 PREDNISONE PER 1 MG: Performed by: EMERGENCY MEDICINE

## 2018-05-13 RX ORDER — PREDNISONE 20 MG/1
60 TABLET ORAL ONCE
Status: COMPLETED | OUTPATIENT
Start: 2018-05-13 | End: 2018-05-13

## 2018-05-13 RX ORDER — PREDNISONE 20 MG/1
60 TABLET ORAL DAILY
Qty: 15 TABLET | Refills: 0 | Status: SHIPPED | OUTPATIENT
Start: 2018-05-13 | End: 2018-07-23

## 2018-05-13 RX ADMIN — PREDNISONE 60 MG: 20 TABLET ORAL at 21:55

## 2018-05-14 NOTE — ED PROVIDER NOTES
Subjective   32 years old healthy male presented in the ER with a rash in the left lateral neck and upper chest for the last 5 days.  He was outside in the folds 5 days ago and later started to have some hives.  This itching and have some vesicle with weeping.  He is using over-the-counter itch medication with no significant relief.  No swelling around the lesions.  No fever or chills reported.  No rash anywhere else.        History provided by:  Patient  Rash   Location:  Head/neck and torso  Quality: burning, itchiness and swelling    Severity:  Moderate  Onset quality:  Gradual  Duration:  5 days  Timing:  Intermittent  Progression:  Worsening  Chronicity:  New  Context: plant contact    Relieved by:  Nothing  Worsened by:  Nothing  Ineffective treatments:  Antihistamines  Associated symptoms: no abdominal pain, no fatigue, no fever, no headaches, no hoarse voice, no joint pain, no shortness of breath, no throat swelling, no tongue swelling, not vomiting and not wheezing    Itching   Associated symptoms: rash    Associated symptoms: no abdominal pain, no congestion, no fatigue, no fever, no headaches, no rhinorrhea, no shortness of breath, no vomiting and no wheezing        Review of Systems   Constitutional: Negative for fatigue and fever.   HENT: Negative for congestion, facial swelling, hoarse voice, rhinorrhea, sinus pain and trouble swallowing.    Eyes: Negative for pain and redness.   Respiratory: Negative for chest tightness, shortness of breath and wheezing.    Gastrointestinal: Negative for abdominal pain and vomiting.   Genitourinary: Negative for flank pain and frequency.   Musculoskeletal: Negative for arthralgias.   Skin: Positive for itching and rash. Negative for color change.   Neurological: Negative for speech difficulty and headaches.   Hematological: Negative for adenopathy.       Past Medical History:   Diagnosis Date   • Closed displaced fracture of body of right calcaneus with routine  healing    • Foot pain, right    • Post-traumatic osteoarthritis of right foot        No Known Allergies    History reviewed. No pertinent surgical history.    Family History   Problem Relation Age of Onset   • No Known Problems Mother    • No Known Problems Father    • No Known Problems Brother    • Cancer Paternal Grandmother    • Cancer Paternal Grandfather        Social History     Social History   • Marital status:      Social History Main Topics   • Smoking status: Former Smoker     Types: Cigarettes   • Smokeless tobacco: Former User     Types: Chew   • Alcohol use No   • Drug use: No   • Sexual activity: Defer     Other Topics Concern   • Not on file           Objective   Physical Exam   Constitutional: He is oriented to person, place, and time. He appears well-developed and well-nourished.   HENT:   Head: Normocephalic and atraumatic.   Nose: Nose normal.   Mouth/Throat: Oropharynx is clear and moist.   Eyes: Conjunctivae are normal.   Neck: Normal range of motion. Neck supple.   Cardiovascular: Normal rate, regular rhythm and normal heart sounds.    Pulmonary/Chest: Effort normal and breath sounds normal. No respiratory distress. He has no wheezes.   Musculoskeletal: Normal range of motion.   Neurological: He is alert and oriented to person, place, and time.   Skin: Skin is warm. Capillary refill takes less than 2 seconds. Rash noted. Rash is vesicular and urticarial.        Psychiatric: He has a normal mood and affect.   Nursing note and vitals reviewed.      Procedures           ED Course  ED Course                  MDM  Number of Diagnoses or Management Options  Allergic contact dermatitis due to plants, except food:   Diagnosis management comments: He is given oral steroids.  Has a tent contact dermatitis from contact with plants.  We'll continue with the steroids.  Advised to take Benadryl as needed.  Follow-up with PCP does not feel any improvement.  Return to ER for worsening.        Final  diagnoses:   Allergic contact dermatitis due to plants, except food            Fernando Schwab MD  05/14/18 [id number]

## 2018-05-15 RX ORDER — MELOXICAM 15 MG/1
15 TABLET ORAL DAILY
Qty: 30 TABLET | Refills: 0 | Status: SHIPPED | OUTPATIENT
Start: 2018-05-15 | End: 2018-06-11 | Stop reason: SDUPTHER

## 2018-06-03 ENCOUNTER — HOSPITAL ENCOUNTER (EMERGENCY)
Facility: HOSPITAL | Age: 33
Discharge: HOME | End: 2018-06-03
Attending: FAMILY MEDICINE | Admitting: FAMILY MEDICINE

## 2018-06-03 VITALS
RESPIRATION RATE: 18 BRPM | BODY MASS INDEX: 29.4 KG/M2 | TEMPERATURE: 97.8 F | SYSTOLIC BLOOD PRESSURE: 135 MMHG | HEIGHT: 71 IN | WEIGHT: 210 LBS | OXYGEN SATURATION: 98 % | HEART RATE: 67 BPM | DIASTOLIC BLOOD PRESSURE: 59 MMHG

## 2018-06-03 DIAGNOSIS — L30.9 ACUTE ECZEMA: Primary | ICD-10-CM

## 2018-06-03 PROCEDURE — 99282 EMERGENCY DEPT VISIT SF MDM: CPT

## 2018-06-03 RX ORDER — TRIAMCINOLONE ACETONIDE 1 MG/G
CREAM TOPICAL 3 TIMES DAILY PRN
Qty: 15 G | Refills: 0 | Status: SHIPPED | OUTPATIENT
Start: 2018-06-03

## 2018-06-03 NOTE — ED NOTES
Patient presented to ED with C/O rash on neck for about one month, partially went away but back now.      Romelia Madrid LPN  06/03/18 3743

## 2018-06-03 NOTE — ED PROVIDER NOTES
Subjective    Chief Complaint   Patient presents with   • Rash     Devaughn Rodriguez is a 32 y.o.  male who presents to the ED with complaints of a rash on his neck.  Rash is erythematous and pruritic.  Patient is concerned that he may have scabies.  He is tried multiple over-the-counter ointments and Cortizone 10 to little avail of his symptoms.  He does admit to scratching the area and causing openings in the skin.      Of note patient was seen in the ED on May 13 and prescribed oral steroids and advised to use Benadryl as needed.  Patient was advised to follow-up with PCP or return to the ED if symptoms worsen.        Rash   Location:  Head/neck  Quality: burning, dryness, itchiness, peeling and redness    Onset quality:  Sudden  Duration:  2 weeks  Timing:  Constant  Progression:  Waxing and waning  Chronicity:  New  Context: not animal contact, not chemical exposure, not exposure to similar rash, not hot tub use, not new detergent/soap and not sick contacts    Relieved by:  Nothing  Worsened by:  Moisture (OTC ointments)  Ineffective treatments:  Topical steroids (Cortizone 10)  Associated symptoms: no abdominal pain, no diarrhea, no fatigue, no fever, no headaches, no induration, no nausea, no shortness of breath, no sore throat and not vomiting        Review of Systems   Constitutional: Negative for chills, diaphoresis, fatigue and fever.   HENT: Negative for congestion, rhinorrhea, sneezing and sore throat.    Respiratory: Negative for cough and shortness of breath.    Cardiovascular: Negative for chest pain and leg swelling.   Gastrointestinal: Negative for abdominal pain, constipation, diarrhea, nausea and vomiting.   Genitourinary: Negative for difficulty urinating and hematuria.   Musculoskeletal: Negative for gait problem and joint swelling.   Skin: Positive for rash. Negative for wound.   Neurological: Negative for seizures, syncope and headaches.   Psychiatric/Behavioral: Negative for  "confusion and sleep disturbance.       Past Medical History:   Diagnosis Date   • Closed displaced fracture of body of right calcaneus with routine healing    • Foot pain, right    • Post-traumatic osteoarthritis of right foot        No Known Allergies    History reviewed. No pertinent surgical history.    Family History   Problem Relation Age of Onset   • No Known Problems Mother    • No Known Problems Father    • No Known Problems Brother    • Cancer Paternal Grandmother    • Cancer Paternal Grandfather        Social History     Social History   • Marital status:      Social History Main Topics   • Smoking status: Former Smoker     Types: Cigarettes   • Smokeless tobacco: Former User     Types: Chew   • Alcohol use No   • Drug use: No   • Sexual activity: Defer     Other Topics Concern   • Not on file           Objective    Vitals:    06/03/18 1625   BP: 135/59   BP Location: Right arm   Patient Position: Sitting   Pulse: 67   Resp: 18   Temp: 97.8 °F (36.6 °C)   TempSrc: Oral   SpO2: 98%   Weight: 95.3 kg (210 lb)   Height: 180.3 cm (71\")     Physical Exam   Constitutional: He is oriented to person, place, and time. He appears well-developed and well-nourished. No distress.   HENT:   Head: Normocephalic and atraumatic.   Mouth/Throat: Oropharynx is clear and moist.   Eyes: Conjunctivae and EOM are normal. Pupils are equal, round, and reactive to light. No scleral icterus.   Neck: Normal range of motion. Neck supple. Erythema present. No tracheal deviation present. No thyromegaly present.   Cardiovascular: Normal rate, regular rhythm, normal heart sounds and intact distal pulses.    Pulmonary/Chest: Effort normal and breath sounds normal. He has no wheezes. He has no rales.   Lymphadenopathy:     He has no cervical adenopathy.   Neurological: He is alert and oriented to person, place, and time. No cranial nerve deficit.   Skin: Skin is warm and dry. No rash noted. He is not diaphoretic. There is erythema ( " Diffuse erythema across neck).   Areas of the neck had broken skin where the patient had scratched it.   Psychiatric: He has a normal mood and affect. His behavior is normal. Judgment and thought content normal.   Nursing note and vitals reviewed.      Procedures           ED Course                  MDM  Number of Diagnoses or Management Options  Acute eczema: new and does not require workup  Diagnosis management comments: Patient was seen previously for the rash, presumed to be contact dermatitis.  Rash has not responded to low potency topical steroids or oral steroids.  We'll try moderate potency topical steroids.        Amount and/or Complexity of Data Reviewed  Decide to obtain previous medical records or to obtain history from someone other than the patient: yes    Risk of Complications, Morbidity, and/or Mortality  Presenting problems: moderate  Diagnostic procedures: low  Management options: low    Critical Care  Total time providing critical care: < 30 minutes    Patient Progress  Patient progress: stable        Final diagnoses:   Acute eczema        Your medication list      START taking these medications      Instructions Last Dose Given Next Dose Due   mupirocin 2 % ointment  Commonly known as:  BACTROBAN      Apply  topically 3 (Three) Times a Day for 5 days.       triamcinolone 0.1 % cream  Commonly known as:  KENALOG      Apply  topically 3 (Three) Times a Day As Needed for Rash (eczema).          CONTINUE taking these medications      Instructions Last Dose Given Next Dose Due   cetirizine-pseudoephedrine 5-120 MG per 12 hr tablet  Commonly known as:  ZyrTEC-D      Take 1 tablet by mouth 2 (Two) Times a Day As Needed for Rhinitis.       gabapentin 300 MG capsule  Commonly known as:  NEURONTIN      Take 1 capsule by mouth 2 (Two) Times a Day.       meloxicam 15 MG tablet  Commonly known as:  MOBIC      TAKE 1 TABLET BY MOUTH DAILY.          STOP taking these medications    predniSONE 20 MG  tablet  Commonly known as:  DELTASONE              Where to Get Your Medications      You can get these medications from any pharmacy    Bring a paper prescription for each of these medications  · mupirocin 2 % ointment  · triamcinolone 0.1 % cream       Follow-up Information     Madelin Irwin MD In 2 days.    Specialty:  Family Medicine  Why:  If symptoms worsen  Contact information:  [facility] [address] [place] [address]  [phone]                   Signature  Madelin Irwin MD  [facility] Family Medicine Resident, PGY II        This document has been electronically signed by Madelin Irwin MD on Jerrica 3, 2018 5:49 PM       Madelin Irwin MD  Resident  06/04/18 1798

## 2018-06-04 ENCOUNTER — TELEPHONE (OUTPATIENT)
Dept: FAMILY MEDICINE CLINIC | Facility: CLINIC | Age: 33
End: 2018-06-04

## 2018-06-11 ENCOUNTER — OFFICE VISIT (OUTPATIENT)
Dept: PODIATRY | Facility: CLINIC | Age: 33
End: 2018-06-11

## 2018-06-11 VITALS — BODY MASS INDEX: 29.41 KG/M2 | HEIGHT: 71 IN | WEIGHT: 210.1 LBS

## 2018-06-11 DIAGNOSIS — M79.671 RIGHT FOOT PAIN: Primary | ICD-10-CM

## 2018-06-11 DIAGNOSIS — S92.011S CLOSED DISPLACED FRACTURE OF BODY OF RIGHT CALCANEUS, SEQUELA: ICD-10-CM

## 2018-06-11 DIAGNOSIS — M19.171 POST-TRAUMATIC OSTEOARTHRITIS OF RIGHT FOOT: ICD-10-CM

## 2018-06-11 PROCEDURE — 99213 OFFICE O/P EST LOW 20 MIN: CPT | Performed by: PODIATRIST

## 2018-06-11 RX ORDER — MELOXICAM 15 MG/1
15 TABLET ORAL DAILY
Qty: 30 TABLET | Refills: 3 | Status: SHIPPED | OUTPATIENT
Start: 2018-06-11 | End: 2018-07-26 | Stop reason: HOSPADM

## 2018-06-11 NOTE — PROGRESS NOTES
Devaughn Rodriguez  1985  32 y.o. male     Patient presents today for right foot pain.       Chief Complaint   Patient presents with   • Right Foot - Pain, Follow-up       History of Present Illness    Patient presents to clinic today for follow-up. He states that recently he heard a popping in his right foot.  He is concerned that he may cause greater damage.  He currently rates his pain as a 5 out of 10.  He describes it as sharp and achy.  He is wearing his ankle brace the majority of the time.  He has no other pedal complaints      Past Medical History:   Diagnosis Date   • Closed displaced fracture of body of right calcaneus with routine healing    • Foot pain, right    • Post-traumatic osteoarthritis of right foot          History reviewed. No pertinent surgical history.      Family History   Problem Relation Age of Onset   • No Known Problems Mother    • No Known Problems Father    • No Known Problems Brother    • Cancer Paternal Grandmother    • Cancer Paternal Grandfather        No Known Allergies    Social History     Social History   • Marital status:      Spouse name: N/A   • Number of children: N/A   • Years of education: N/A     Occupational History   • Not on file.     Social History Main Topics   • Smoking status: Former Smoker     Types: Cigarettes   • Smokeless tobacco: Former User     Types: Chew   • Alcohol use No   • Drug use: No   • Sexual activity: Defer     Other Topics Concern   • Not on file     Social History Narrative   • No narrative on file         Current Outpatient Prescriptions   Medication Sig Dispense Refill   • cetirizine-pseudoephedrine (ZyrTEC-D) 5-120 MG per 12 hr tablet Take 1 tablet by mouth 2 (Two) Times a Day As Needed for Rhinitis. 14 tablet 0   • gabapentin (NEURONTIN) 300 MG capsule Take 1 capsule by mouth 2 (Two) Times a Day. 60 capsule 0   • meloxicam (MOBIC) 15 MG tablet Take 1 tablet by mouth Daily. 30 tablet 3   • predniSONE (DELTASONE) 20 MG tablet  "Take 3 tablets by mouth Daily. 15 tablet 0   • triamcinolone (KENALOG) 0.1 % cream Apply  topically 3 (Three) Times a Day As Needed for Rash (eczema). 15 g 0     No current facility-administered medications for this visit.          OBJECTIVE    Ht 180.3 cm (70.98\")   Wt 95.3 kg (210 lb 1.6 oz)   BMI 29.32 kg/m²       Review of Systems   Constitutional: Negative.    HENT: Negative.    Respiratory: Negative.    Cardiovascular: Negative.    Gastrointestinal: Negative.    Musculoskeletal:        Right foot pain     Skin: Negative.    Neurological: Positive for numbness.   Psychiatric/Behavioral: Negative.          Constitutional: well developed, well nourished    HEENT: Normocephalic and atraumatic, normal hearing    Respiratory: Non labored respirations noted    Right lower extremity exam    Cardiovascular:    DP/PT pulses palpable    Pedal hair growth present.  Nonpitting edema noted to the right lower extremity    Musculoskeletal:  Muscle strength is 5/5 for plantar flexors and inverters on the right and 4 out of 5 for dorsiflexors and everters on the right.  ROM of the 1st MTP is full without pain or crepitus  ROM of the MTJ is full without pain or crepitus    ROM of the STJ is decreased with pain and crepitus     ROM of the ankle joint is full without pain or crepitus    Pain on palpation to the sinus tarsi  Hindfoot inversion noted    Dermatological:   Skin is warm, dry and intact    Webspaces 1-4 bilateral are clean, dry and intact.   No subcutaneous nodules or masses noted    No open wounds noted     Neurological:   Protective sensation decreased to the great toe and medial arch  Sensation intact to light touch    DTR intact    Psychiatric: A&O x 3 with normal mood and affect. NAD.        Procedures        ASSESSMENT AND PLAN    Devaughn was seen today for pain and follow-up.    Diagnoses and all orders for this visit:    Right foot pain  -     XR Foot 3+ View Right    Closed displaced fracture of body of right " calcaneus, sequela    Post-traumatic osteoarthritis of right foot    Other orders  -     meloxicam (MOBIC) 15 MG tablet; Take 1 tablet by mouth Daily.    - X-rays taken and reviewed.  Negative exam when compared to previous exam  - Refill mobic  - Plan for subtalar joint arthrodesis in late July   - All his questions were answered.  - Return to clinic in 2 months          This document has been electronically signed by Junaid Avery DPM on June 11, 2018 3:15 PM     6/11/2018  3:15 PM

## 2018-06-20 ENCOUNTER — TELEPHONE (OUTPATIENT)
Dept: PODIATRY | Facility: CLINIC | Age: 33
End: 2018-06-20

## 2018-06-20 ENCOUNTER — DOCUMENTATION (OUTPATIENT)
Dept: PODIATRY | Facility: CLINIC | Age: 33
End: 2018-06-20

## 2018-06-20 NOTE — TELEPHONE ENCOUNTER
ISAAC PEDERSEN FROM INSURANCE CALLED REGARDING THE FOLLOWING:    WANTS TO KNOW THE WORK STATUS AFTER EACH APPOINTMENT.  SAYS SHE HAS THE DOCTOR NOTES ALREADY.    SHE IS ALSO LOOKING FOR THE PRECET FORMS THAT WERE SENT IN FOR PATIENT.      [phone]  [phone]

## 2018-07-12 ENCOUNTER — TELEPHONE (OUTPATIENT)
Dept: PODIATRY | Facility: CLINIC | Age: 33
End: 2018-07-12

## 2018-07-12 NOTE — TELEPHONE ENCOUNTER
GRAY      PATIENT CALLED AND WANTED TO MAKE SURE EVERYTHING WAS SET FOR HIS SURGERY ON THE 26TH.    THANKS.

## 2018-07-12 NOTE — TELEPHONE ENCOUNTER
We have submitted everything to surgery and pre-admissions. He needs to contact his workers comp to see if they are going to pay for the procedure.

## 2018-07-16 ENCOUNTER — TELEPHONE (OUTPATIENT)
Dept: PODIATRY | Facility: CLINIC | Age: 33
End: 2018-07-16

## 2018-07-18 ENCOUNTER — TELEPHONE (OUTPATIENT)
Dept: PODIATRY | Facility: CLINIC | Age: 33
End: 2018-07-18

## 2018-07-23 ENCOUNTER — APPOINTMENT (OUTPATIENT)
Dept: PREADMISSION TESTING | Facility: HOSPITAL | Age: 33
End: 2018-07-23

## 2018-07-23 ENCOUNTER — TELEPHONE (OUTPATIENT)
Dept: PODIATRY | Facility: CLINIC | Age: 33
End: 2018-07-23

## 2018-07-23 VITALS
RESPIRATION RATE: 18 BRPM | BODY MASS INDEX: 29.12 KG/M2 | SYSTOLIC BLOOD PRESSURE: 110 MMHG | HEART RATE: 63 BPM | DIASTOLIC BLOOD PRESSURE: 76 MMHG | HEIGHT: 71 IN | WEIGHT: 208 LBS | OXYGEN SATURATION: 99 %

## 2018-07-23 RX ORDER — GABAPENTIN 300 MG/1
300 CAPSULE ORAL 2 TIMES DAILY PRN
COMMUNITY

## 2018-07-23 RX ORDER — SODIUM CHLORIDE, SODIUM GLUCONATE, SODIUM ACETATE, POTASSIUM CHLORIDE, AND MAGNESIUM CHLORIDE 526; 502; 368; 37; 30 MG/100ML; MG/100ML; MG/100ML; MG/100ML; MG/100ML
1000 INJECTION, SOLUTION INTRAVENOUS CONTINUOUS
Status: CANCELLED | OUTPATIENT
Start: 2018-07-26 | End: 2019-07-26

## 2018-07-23 NOTE — TELEPHONE ENCOUNTER
ISAAC:    PATIENT CALLED AND WOULD LIKE A REFILL ON gabapentin (NEURONTIN) 300 MG capsule     BUT THE LAST THING YOU SENT IN FOR HIM IS meloxicam (MOBIC) 15 MG tablet     SO IM NOT SURE WHICH ONE YOU WOULD LIKE FOR HIM TO HAVE.  SAID YOU COULD SEND IT IN TO [facility].      ALSO HE HAS A PRE OP APPOINTMENT TODAY AND WAS WONDERING IF HE NEEDED TO DO ANYTHING ELSE.  I TOLD HIM USUALLY YOU GO TO PREOP THEY CALL THE DAY BEFORE SURGERY TO LET YOU KNOW WHAT TIME TO BE THERE.  SO IF HE NEEDS TO DO SOMETHING ELSE PRIOR TO SURGERY ON 7/26 WILL YOU PLEASE LET HIM KNOW ASAP.

## 2018-07-25 ENCOUNTER — TELEPHONE (OUTPATIENT)
Dept: PODIATRY | Facility: CLINIC | Age: 33
End: 2018-07-25

## 2018-07-25 NOTE — TELEPHONE ENCOUNTER
ISAAC      STOPPED HERE LOOKING TO  HIS SCRIPT FOR GABAPENTIN.    SAYS HE CALLED YESTERDAY AND THE DAY BEFORE FOR IT.    THANKS.

## 2018-07-26 ENCOUNTER — ANESTHESIA (OUTPATIENT)
Dept: PERIOP | Facility: HOSPITAL | Age: 33
End: 2018-07-26

## 2018-07-26 ENCOUNTER — HOSPITAL ENCOUNTER (OUTPATIENT)
Facility: HOSPITAL | Age: 33
Setting detail: HOSPITAL OUTPATIENT SURGERY
Discharge: HOME | End: 2018-07-26
Attending: PODIATRIST | Admitting: PODIATRIST

## 2018-07-26 ENCOUNTER — APPOINTMENT (OUTPATIENT)
Dept: GENERAL RADIOLOGY | Facility: HOSPITAL | Age: 33
End: 2018-07-26

## 2018-07-26 ENCOUNTER — ANESTHESIA EVENT (OUTPATIENT)
Dept: PERIOP | Facility: HOSPITAL | Age: 33
End: 2018-07-26

## 2018-07-26 VITALS
SYSTOLIC BLOOD PRESSURE: 132 MMHG | WEIGHT: 204.59 LBS | RESPIRATION RATE: 18 BRPM | BODY MASS INDEX: 28.64 KG/M2 | HEART RATE: 118 BPM | OXYGEN SATURATION: 96 % | HEIGHT: 71 IN | DIASTOLIC BLOOD PRESSURE: 76 MMHG | TEMPERATURE: 98.8 F

## 2018-07-26 DIAGNOSIS — S92.011S CLOSED DISPLACED FRACTURE OF BODY OF RIGHT CALCANEUS, SEQUELA: ICD-10-CM

## 2018-07-26 DIAGNOSIS — M19.171 POST-TRAUMATIC OSTEOARTHRITIS OF RIGHT FOOT: ICD-10-CM

## 2018-07-26 LAB
AMPHET+METHAMPHET UR QL: NEGATIVE
BARBITURATES UR QL SCN: NEGATIVE
BENZODIAZ UR QL SCN: NEGATIVE
CANNABINOIDS SERPL QL: NEGATIVE
COCAINE UR QL: NEGATIVE
METHADONE UR QL SCN: NEGATIVE
OPIATES UR QL: NEGATIVE
OXYCODONE UR QL SCN: NEGATIVE

## 2018-07-26 PROCEDURE — 25010000002 PROPOFOL 10 MG/ML EMULSION: Performed by: NURSE ANESTHETIST, CERTIFIED REGISTERED

## 2018-07-26 PROCEDURE — 25010000002 PHENYLEPHRINE PER 1 ML: Performed by: NURSE ANESTHETIST, CERTIFIED REGISTERED

## 2018-07-26 PROCEDURE — C1713 ANCHOR/SCREW BN/BN,TIS/BN: HCPCS | Performed by: PODIATRIST

## 2018-07-26 PROCEDURE — 25010000002 MIDAZOLAM PER 1 MG: Performed by: NURSE ANESTHETIST, CERTIFIED REGISTERED

## 2018-07-26 PROCEDURE — 80307 DRUG TEST PRSMV CHEM ANLYZR: CPT | Performed by: ANESTHESIOLOGY

## 2018-07-26 PROCEDURE — 25010000002 ROPIVACAINE PER 1 MG: Performed by: NURSE ANESTHETIST, CERTIFIED REGISTERED

## 2018-07-26 PROCEDURE — C1769 GUIDE WIRE: HCPCS | Performed by: PODIATRIST

## 2018-07-26 PROCEDURE — 76000 FLUOROSCOPY <1 HR PHYS/QHP: CPT

## 2018-07-26 PROCEDURE — 25010000002 DEXAMETHASONE PER 1 MG: Performed by: NURSE ANESTHETIST, CERTIFIED REGISTERED

## 2018-07-26 PROCEDURE — 25010000002 FENTANYL CITRATE (PF) 100 MCG/2ML SOLUTION: Performed by: NURSE ANESTHETIST, CERTIFIED REGISTERED

## 2018-07-26 PROCEDURE — 28725 ARTHRODESIS SUBTALAR: CPT | Performed by: PODIATRIST

## 2018-07-26 PROCEDURE — 25010000002 ONDANSETRON PER 1 MG: Performed by: NURSE ANESTHETIST, CERTIFIED REGISTERED

## 2018-07-26 PROCEDURE — 25010000002 HYDROMORPHONE PER 4 MG: Performed by: NURSE ANESTHETIST, CERTIFIED REGISTERED

## 2018-07-26 DEVICE — BONE CANC/CORT BLND 80/20 30CC: Type: IMPLANTABLE DEVICE | Status: FUNCTIONAL

## 2018-07-26 DEVICE — HEADLESS COMPRESSION SCREW
Type: IMPLANTABLE DEVICE | Status: FUNCTIONAL
Brand: FIXOS

## 2018-07-26 RX ORDER — EPHEDRINE SULFATE 50 MG/ML
5 INJECTION, SOLUTION INTRAVENOUS ONCE AS NEEDED
Status: DISCONTINUED | OUTPATIENT
Start: 2018-07-26 | End: 2018-07-26 | Stop reason: HOSPADM

## 2018-07-26 RX ORDER — SODIUM CHLORIDE, SODIUM GLUCONATE, SODIUM ACETATE, POTASSIUM CHLORIDE, AND MAGNESIUM CHLORIDE 526; 502; 368; 37; 30 MG/100ML; MG/100ML; MG/100ML; MG/100ML; MG/100ML
1000 INJECTION, SOLUTION INTRAVENOUS CONTINUOUS
Status: DISCONTINUED | OUTPATIENT
Start: 2018-07-26 | End: 2018-07-26 | Stop reason: HOSPADM

## 2018-07-26 RX ORDER — ROPIVACAINE HYDROCHLORIDE 5 MG/ML
INJECTION, SOLUTION EPIDURAL; INFILTRATION; PERINEURAL AS NEEDED
Status: DISCONTINUED | OUTPATIENT
Start: 2018-07-26 | End: 2018-07-26 | Stop reason: SURG

## 2018-07-26 RX ORDER — DEXAMETHASONE SODIUM PHOSPHATE 4 MG/ML
INJECTION, SOLUTION INTRA-ARTICULAR; INTRALESIONAL; INTRAMUSCULAR; INTRAVENOUS; SOFT TISSUE AS NEEDED
Status: DISCONTINUED | OUTPATIENT
Start: 2018-07-26 | End: 2018-07-26 | Stop reason: SURG

## 2018-07-26 RX ORDER — BUPIVACAINE HCL/0.9 % NACL/PF 0.1 %
2 PLASTIC BAG, INJECTION (ML) EPIDURAL ONCE
Status: COMPLETED | OUTPATIENT
Start: 2018-07-26 | End: 2018-07-26

## 2018-07-26 RX ORDER — CYCLOBENZAPRINE HCL 10 MG
10 TABLET ORAL 3 TIMES DAILY PRN
Qty: 21 TABLET | Refills: 0 | Status: SHIPPED | OUTPATIENT
Start: 2018-07-26

## 2018-07-26 RX ORDER — ACETAMINOPHEN 650 MG/1
650 SUPPOSITORY RECTAL ONCE AS NEEDED
Status: DISCONTINUED | OUTPATIENT
Start: 2018-07-26 | End: 2018-07-26 | Stop reason: HOSPADM

## 2018-07-26 RX ORDER — NALOXONE HCL 0.4 MG/ML
0.2 VIAL (ML) INJECTION AS NEEDED
Status: DISCONTINUED | OUTPATIENT
Start: 2018-07-26 | End: 2018-07-26 | Stop reason: HOSPADM

## 2018-07-26 RX ORDER — EPHEDRINE SULFATE 50 MG/ML
INJECTION, SOLUTION INTRAVENOUS AS NEEDED
Status: DISCONTINUED | OUTPATIENT
Start: 2018-07-26 | End: 2018-07-26 | Stop reason: SURG

## 2018-07-26 RX ORDER — GABAPENTIN 300 MG/1
300 CAPSULE ORAL 3 TIMES DAILY
Qty: 90 CAPSULE | Refills: 0 | Status: SHIPPED | OUTPATIENT
Start: 2018-07-26

## 2018-07-26 RX ORDER — HYDROMORPHONE HCL 110MG/55ML
PATIENT CONTROLLED ANALGESIA SYRINGE INTRAVENOUS AS NEEDED
Status: DISCONTINUED | OUTPATIENT
Start: 2018-07-26 | End: 2018-07-26 | Stop reason: SURG

## 2018-07-26 RX ORDER — MEPERIDINE HYDROCHLORIDE 50 MG/ML
12.5 INJECTION INTRAMUSCULAR; INTRAVENOUS; SUBCUTANEOUS
Status: DISCONTINUED | OUTPATIENT
Start: 2018-07-26 | End: 2018-07-26 | Stop reason: HOSPADM

## 2018-07-26 RX ORDER — CETIRIZINE HYDROCHLORIDE 10 MG/1
10 TABLET ORAL DAILY
COMMUNITY

## 2018-07-26 RX ORDER — DIPHENHYDRAMINE HYDROCHLORIDE 50 MG/ML
12.5 INJECTION INTRAMUSCULAR; INTRAVENOUS
Status: DISCONTINUED | OUTPATIENT
Start: 2018-07-26 | End: 2018-07-26 | Stop reason: HOSPADM

## 2018-07-26 RX ORDER — FENTANYL CITRATE 50 UG/ML
INJECTION, SOLUTION INTRAMUSCULAR; INTRAVENOUS AS NEEDED
Status: DISCONTINUED | OUTPATIENT
Start: 2018-07-26 | End: 2018-07-26 | Stop reason: SURG

## 2018-07-26 RX ORDER — ONDANSETRON 8 MG/1
8 TABLET, ORALLY DISINTEGRATING ORAL EVERY 8 HOURS PRN
Qty: 30 TABLET | Refills: 0 | Status: SHIPPED | OUTPATIENT
Start: 2018-07-26

## 2018-07-26 RX ORDER — ACETAMINOPHEN 325 MG/1
650 TABLET ORAL ONCE AS NEEDED
Status: DISCONTINUED | OUTPATIENT
Start: 2018-07-26 | End: 2018-07-26 | Stop reason: HOSPADM

## 2018-07-26 RX ORDER — FLUMAZENIL 0.1 MG/ML
0.2 INJECTION INTRAVENOUS AS NEEDED
Status: DISCONTINUED | OUTPATIENT
Start: 2018-07-26 | End: 2018-07-26 | Stop reason: HOSPADM

## 2018-07-26 RX ORDER — ONDANSETRON 2 MG/ML
4 INJECTION INTRAMUSCULAR; INTRAVENOUS ONCE AS NEEDED
Status: DISCONTINUED | OUTPATIENT
Start: 2018-07-26 | End: 2018-07-26 | Stop reason: HOSPADM

## 2018-07-26 RX ORDER — LABETALOL HYDROCHLORIDE 5 MG/ML
5 INJECTION, SOLUTION INTRAVENOUS
Status: DISCONTINUED | OUTPATIENT
Start: 2018-07-26 | End: 2018-07-26 | Stop reason: HOSPADM

## 2018-07-26 RX ORDER — MIDAZOLAM HYDROCHLORIDE 1 MG/ML
INJECTION INTRAMUSCULAR; INTRAVENOUS AS NEEDED
Status: DISCONTINUED | OUTPATIENT
Start: 2018-07-26 | End: 2018-07-26 | Stop reason: SURG

## 2018-07-26 RX ORDER — LIDOCAINE HYDROCHLORIDE 20 MG/ML
INJECTION, SOLUTION INFILTRATION; PERINEURAL AS NEEDED
Status: DISCONTINUED | OUTPATIENT
Start: 2018-07-26 | End: 2018-07-26 | Stop reason: SURG

## 2018-07-26 RX ORDER — PROPOFOL 10 MG/ML
VIAL (ML) INTRAVENOUS AS NEEDED
Status: DISCONTINUED | OUTPATIENT
Start: 2018-07-26 | End: 2018-07-26 | Stop reason: SURG

## 2018-07-26 RX ORDER — ONDANSETRON 2 MG/ML
INJECTION INTRAMUSCULAR; INTRAVENOUS AS NEEDED
Status: DISCONTINUED | OUTPATIENT
Start: 2018-07-26 | End: 2018-07-26 | Stop reason: SURG

## 2018-07-26 RX ORDER — OXYCODONE AND ACETAMINOPHEN 10; 325 MG/1; MG/1
1 TABLET ORAL EVERY 4 HOURS PRN
Qty: 42 TABLET | Refills: 0 | Status: SHIPPED | OUTPATIENT
Start: 2018-07-26 | End: 2018-07-30 | Stop reason: SDUPTHER

## 2018-07-26 RX ADMIN — EPHEDRINE SULFATE 5 MG: 50 INJECTION INTRAVENOUS at 11:05

## 2018-07-26 RX ADMIN — HYDROMORPHONE HYDROCHLORIDE 0.5 MG: 2 INJECTION INTRAMUSCULAR; INTRAVENOUS; SUBCUTANEOUS at 13:31

## 2018-07-26 RX ADMIN — FENTANYL CITRATE 25 MCG: 50 INJECTION, SOLUTION INTRAMUSCULAR; INTRAVENOUS at 12:24

## 2018-07-26 RX ADMIN — FENTANYL CITRATE 100 MCG: 50 INJECTION, SOLUTION INTRAMUSCULAR; INTRAVENOUS at 10:56

## 2018-07-26 RX ADMIN — FENTANYL CITRATE 50 MCG: 50 INJECTION, SOLUTION INTRAMUSCULAR; INTRAVENOUS at 13:09

## 2018-07-26 RX ADMIN — SODIUM CHLORIDE, SODIUM GLUCONATE, SODIUM ACETATE, POTASSIUM CHLORIDE, AND MAGNESIUM CHLORIDE 1000 ML: 526; 502; 368; 37; 30 INJECTION, SOLUTION INTRAVENOUS at 09:01

## 2018-07-26 RX ADMIN — HYDROMORPHONE HYDROCHLORIDE 0.5 MG: 2 INJECTION INTRAMUSCULAR; INTRAVENOUS; SUBCUTANEOUS at 13:35

## 2018-07-26 RX ADMIN — PHENYLEPHRINE HYDROCHLORIDE 100 MCG: 10 INJECTION INTRAVENOUS at 10:52

## 2018-07-26 RX ADMIN — MIDAZOLAM 2 MG: 1 INJECTION INTRAMUSCULAR; INTRAVENOUS at 10:33

## 2018-07-26 RX ADMIN — SODIUM CHLORIDE, SODIUM GLUCONATE, SODIUM ACETATE, POTASSIUM CHLORIDE, AND MAGNESIUM CHLORIDE: 526; 502; 368; 37; 30 INJECTION, SOLUTION INTRAVENOUS at 11:56

## 2018-07-26 RX ADMIN — ONDANSETRON 4 MG: 2 INJECTION INTRAMUSCULAR; INTRAVENOUS at 13:21

## 2018-07-26 RX ADMIN — EPHEDRINE SULFATE 10 MG: 50 INJECTION INTRAVENOUS at 11:14

## 2018-07-26 RX ADMIN — FENTANYL CITRATE 25 MCG: 50 INJECTION, SOLUTION INTRAMUSCULAR; INTRAVENOUS at 12:39

## 2018-07-26 RX ADMIN — EPHEDRINE SULFATE 5 MG: 50 INJECTION INTRAVENOUS at 11:00

## 2018-07-26 RX ADMIN — FENTANYL CITRATE 25 MCG: 50 INJECTION, SOLUTION INTRAMUSCULAR; INTRAVENOUS at 12:13

## 2018-07-26 RX ADMIN — EPHEDRINE SULFATE 5 MG: 50 INJECTION INTRAVENOUS at 11:30

## 2018-07-26 RX ADMIN — FENTANYL CITRATE 25 MCG: 50 INJECTION, SOLUTION INTRAMUSCULAR; INTRAVENOUS at 12:32

## 2018-07-26 RX ADMIN — DEXAMETHASONE SODIUM PHOSPHATE 4 MG: 4 INJECTION, SOLUTION INTRAMUSCULAR; INTRAVENOUS at 10:47

## 2018-07-26 RX ADMIN — Medication 2 G: at 10:50

## 2018-07-26 RX ADMIN — EPHEDRINE SULFATE 5 MG: 50 INJECTION INTRAVENOUS at 11:35

## 2018-07-26 RX ADMIN — PROPOFOL 50 MG: 10 INJECTION, EMULSION INTRAVENOUS at 10:44

## 2018-07-26 RX ADMIN — PHENYLEPHRINE HYDROCHLORIDE 100 MCG: 10 INJECTION INTRAVENOUS at 11:56

## 2018-07-26 RX ADMIN — EPHEDRINE SULFATE 5 MG: 50 INJECTION INTRAVENOUS at 11:51

## 2018-07-26 RX ADMIN — LIDOCAINE HYDROCHLORIDE 50 MG: 20 INJECTION, SOLUTION INFILTRATION; PERINEURAL at 10:41

## 2018-07-26 RX ADMIN — ROPIVACAINE HYDROCHLORIDE 30 ML: 5 INJECTION, SOLUTION EPIDURAL; INFILTRATION; PERINEURAL at 10:04

## 2018-07-26 RX ADMIN — PROPOFOL 200 MG: 10 INJECTION, EMULSION INTRAVENOUS at 10:41

## 2018-07-26 RX ADMIN — EPHEDRINE SULFATE 5 MG: 50 INJECTION INTRAVENOUS at 11:43

## 2018-07-26 NOTE — ANESTHESIA PREPROCEDURE EVALUATION
Anesthesia Evaluation     no history of anesthetic complications:  NPO Solid Status: > 8 hours  NPO Liquid Status: > 8 hours           Airway   Mallampati: II  TM distance: >3 FB  Neck ROM: full  Possible difficult intubation  Dental - normal exam         Pulmonary - negative pulmonary ROS and normal exam    breath sounds clear to auscultation  Cardiovascular - negative cardio ROS and normal exam  Exercise tolerance: good (4-7 METS)    Rhythm: regular  Rate: normal    (-) angina      Neuro/Psych- negative ROS  GI/Hepatic/Renal/Endo - negative ROS     Musculoskeletal     (+) radiculopathy (right foot)      ROS comment: Right ankle  Abdominal  - normal exam   Substance History - negative use     OB/GYN          Other   (+) arthritis     (-) history of cancer                  Anesthesia Plan    ASA 2     regional and general     intravenous induction   Anesthetic plan and risks discussed with patient.

## 2018-07-26 NOTE — ANESTHESIA POSTPROCEDURE EVALUATION
Patient: Devaughn Rodriguez    Procedure Summary     Date:  07/26/18 Room / Location:  [facility] OR  / [facility] OR    Anesthesia Start:  1034 Anesthesia Stop:  1359    Procedure:  ANKLE SUBTALAR JOINT  ARTHRODESIS AND ALL OTHER INDICATED PROCEDURES (Right Foot) Diagnosis:       Closed displaced fracture of body of right calcaneus, sequela      Post-traumatic osteoarthritis of right foot      (Closed displaced fracture of body of right calcaneus, sequela [S92.011S])      (Post-traumatic osteoarthritis of right foot [M19.171])    Surgeon:  Junaid Avery DPM Provider:  Kirby Mar MD    Anesthesia Type:  regional, general ASA Status:  2          Anesthesia Type: regional, general  Last vitals  BP   136/81 (07/26/18 0847)   Temp   98.1 °F (36.7 °C) (07/26/18 0847)   Pulse   84 (07/26/18 0847)   Resp   18 (07/26/18 0847)     SpO2   97 % (07/26/18 0847)     Post Anesthesia Care and Evaluation    Patient location during evaluation: PACU  Patient participation: complete - patient participated  Level of consciousness: awake and alert  Pain score: 0  Pain management: adequate  Airway patency: patent  Anesthetic complications: No anesthetic complications  PONV Status: none  Cardiovascular status: acceptable  Respiratory status: acceptable  Hydration status: acceptable

## 2018-07-26 NOTE — ANESTHESIA PROCEDURE NOTES
Peripheral Block    Patient location during procedure: pre-op  Start time: 7/26/2018 9:57 AM  Stop time: 7/26/2018 10:01 AM  Reason for block: at surgeon's request and post-op pain management  Performed by  Anesthesiologist: MYKEL RUBIN  Assisted by: PEDRO RICE  Preanesthetic Checklist  Completed: patient identified, site marked, surgical consent, pre-op evaluation, timeout performed, IV checked, risks and benefits discussed and monitors and equipment checked  Prep:  Pt Position: left lateral decubitus  Sterile barriers:cap, gloves and mask  Prep: ChloraPrep  Patient monitoring: blood pressure monitoring and continuous pulse oximetry  Procedure  Sedation:no  Performed under: local infiltration  Guidance:ultrasound guided  ULTRASOUND INTERPRETATION.  Using ultrasound guidance a 21 G gauge needle was placed in close proximity to the sciatic nerve, at which point, under ultrasound guidance anesthetic was injected in the area of the nerve and spread of the anesthesia was seen on ultrasound in close proximity thereto.  There were no abnormalities seen on ultrasound; a digital image was taken; and the patient tolerated the procedure with no complications. Images:still images obtained    Laterality:right  Block Type:popliteal  Injection Technique:single-shot  Needle Type:echogenic  Needle Gauge:22 G    Medications  Comment:Pt ID'd  Ultrasound guided  Needle seen throughout  Local infiltration appropriate  Local Injected:ropivacaine 0.5% Local Amount Injected:30mL  Post Assessment  Injection Assessment: negative aspiration for heme, no paresthesia on injection and incremental injection  Patient Tolerance:comfortable throughout block  Complications:no

## 2018-07-26 NOTE — ANESTHESIA PROCEDURE NOTES
Airway  Urgency: elective      General Information and Staff    Patient location during procedure: OR  CRNA: JESSICA FIELD    Indications and Patient Condition  Indications for airway management: airway protection    Preoxygenated: yes      Final Airway Details  Final airway type: supraglottic airway      Successful airway: I-gel  Size 5    Number of attempts at approach: 1

## 2018-07-30 ENCOUNTER — OFFICE VISIT (OUTPATIENT)
Dept: PODIATRY | Facility: CLINIC | Age: 33
End: 2018-07-30

## 2018-07-30 VITALS — BODY MASS INDEX: 29.01 KG/M2 | WEIGHT: 208 LBS

## 2018-07-30 DIAGNOSIS — M19.171 POST-TRAUMATIC OSTEOARTHRITIS OF RIGHT FOOT: Primary | ICD-10-CM

## 2018-07-30 PROCEDURE — 99024 POSTOP FOLLOW-UP VISIT: CPT | Performed by: PODIATRIST

## 2018-07-30 RX ORDER — OXYCODONE AND ACETAMINOPHEN 10; 325 MG/1; MG/1
1 TABLET ORAL EVERY 4 HOURS PRN
Qty: 42 TABLET | Refills: 0 | Status: SHIPPED | OUTPATIENT
Start: 2018-07-30

## 2018-07-30 NOTE — PROGRESS NOTES
Devaughn Hernández Michael  1985  32 y.o. male        Chief Complaint   Patient presents with   • Right Ankle - Post-op       History of Present Illness    Patient presents to clinic today for his first postoperative visit.  Had right subtalar joint arthrodesis date of surgery 7/26/2018.  His splint is clean, dry and intact.  He is nonweightbearing with crutches.  Relates to moderate postoperative pain today.      Past Medical History:   Diagnosis Date   • Closed displaced fracture of body of right calcaneus with routine healing    • Foot pain, right    • Post-traumatic osteoarthritis of right foot          Past Surgical History:   Procedure Laterality Date   • WISDOM TOOTH EXTRACTION           Family History   Problem Relation Age of Onset   • No Known Problems Mother    • No Known Problems Father    • No Known Problems Brother    • Cancer Paternal Grandmother    • Cancer Paternal Grandfather        No Known Allergies    Social History     Social History   • Marital status: Single     Spouse name: N/A   • Number of children: N/A   • Years of education: N/A     Occupational History   • Not on file.     Social History Main Topics   • Smoking status: Former Smoker     Types: Cigarettes   • Smokeless tobacco: Former User     Types: Chew   • Alcohol use No   • Drug use: No      Comment: hx  9 months clean   • Sexual activity: Defer     Other Topics Concern   • Not on file     Social History Narrative   • No narrative on file         Current Outpatient Prescriptions   Medication Sig Dispense Refill   • cetirizine (zyrTEC) 10 MG tablet Take 10 mg by mouth Daily.     • cyclobenzaprine (FLEXERIL) 10 MG tablet Take 1 tablet by mouth 3 (Three) Times a Day As Needed for Muscle Spasms. 21 tablet 0   • gabapentin (NEURONTIN) 300 MG capsule Take 300 mg by mouth 2 (Two) Times a Day As Needed.     • oxyCODONE-acetaminophen (PERCOCET)  MG per tablet Take 1 tablet by mouth Every 4 (Four) Hours As Needed for Moderate Pain. 42  tablet 0   • gabapentin (NEURONTIN) 300 MG capsule Take 1 capsule by mouth 3 (Three) Times a Day. 90 capsule 0   • ondansetron ODT (ZOFRAN ODT) 8 MG disintegrating tablet Take 1 tablet by mouth Every 8 (Eight) Hours As Needed for Nausea or Vomiting. 30 tablet 0   • triamcinolone (KENALOG) 0.1 % cream Apply  topically 3 (Three) Times a Day As Needed for Rash (eczema). 15 g 0     No current facility-administered medications for this visit.          OBJECTIVE    Wt 94.3 kg (208 lb)   BMI 29.01 kg/m²       Review of Systems   Constitutional: Negative.    HENT: Negative.    Respiratory: Negative.    Cardiovascular: Negative.    Musculoskeletal:        Right foot pain     Neurological: Positive for numbness.   Psychiatric/Behavioral: Negative.          RLE: Incision site is well approximated.  No signs of dehiscence noted.  Severe edema and ecchymosis noted.  Negative Homans       Procedures        ASSESSMENT AND PLAN    Devaughn was seen today for post-op.    Diagnoses and all orders for this visit:    Post-traumatic osteoarthritis of right foot    Other orders  -     oxyCODONE-acetaminophen (PERCOCET)  MG per tablet; Take 1 tablet by mouth Every 4 (Four) Hours As Needed for Moderate Pain.    - Postop day #4  - Sterile dressing change performed.  Reapplied posterior splint  - Patient to be nonweightbearing  - Refill pain medicine  - Ice and elevate at home  - All his questions were answered.  - Return to clinic in 2 weeks          This document has been electronically signed by Junaid Avery DPM on July 30, 2018 3:28 PM     7/30/2018  3:28 PM

## 2018-07-31 ENCOUNTER — TELEPHONE (OUTPATIENT)
Dept: PODIATRY | Facility: CLINIC | Age: 33
End: 2018-07-31

## 2018-07-31 NOTE — TELEPHONE ENCOUNTER
ISAAC:    PATIENT CALLED AND SAID HE SPOKE WITH DR GRAY YESTERDAY AT HIS APPOINTMENT ABOUT A KNEE WALKER AND WAS WONDERING IF IT WAS SENT IN.

## 2018-08-06 ENCOUNTER — TELEPHONE (OUTPATIENT)
Dept: PODIATRY | Facility: CLINIC | Age: 33
End: 2018-08-06

## 2018-08-06 NOTE — TELEPHONE ENCOUNTER
Bennie:      Patient called and he has a splint on and all the padding is gone and it is uncomfortable.  He is wanting to know if he is allowed to remove it or does he need to keep it on.,  Told him you was in a room and that you will call him back as soon as you can

## 2018-08-14 ENCOUNTER — OFFICE VISIT (OUTPATIENT)
Dept: PODIATRY | Facility: CLINIC | Age: 33
End: 2018-08-14

## 2018-08-14 VITALS — WEIGHT: 208 LBS | HEART RATE: 87 BPM | BODY MASS INDEX: 29.12 KG/M2 | HEIGHT: 71 IN

## 2018-08-14 DIAGNOSIS — M19.171 POST-TRAUMATIC OSTEOARTHRITIS OF RIGHT FOOT: Primary | ICD-10-CM

## 2018-08-14 PROCEDURE — 99024 POSTOP FOLLOW-UP VISIT: CPT | Performed by: PODIATRIST

## 2018-08-14 PROCEDURE — 29405 APPL SHORT LEG CAST: CPT | Performed by: PODIATRIST

## 2018-08-14 PROCEDURE — 73630 X-RAY EXAM OF FOOT: CPT | Performed by: PODIATRIST

## 2018-08-14 NOTE — PROGRESS NOTES
Devaughn Hernández Michael  1985  32 y.o. male      patient presents today for post-op recheck of right foot.      Chief Complaint   Patient presents with   • Right Foot - Post-op   • Follow-up     post op       History of Present Illness    Patient presents to clinic today for his second postoperative visit.  Had right subtalar joint arthrodesis date of surgery 7/26/2018.  His splint is clean, dry and intact.  He is nonweightbearing with crutches.  States that he is pain free and has discontinued the use of his narcotic medication.       Past Medical History:   Diagnosis Date   • Closed displaced fracture of body of right calcaneus with routine healing    • Foot pain, right    • Post-traumatic osteoarthritis of right foot          Past Surgical History:   Procedure Laterality Date   • SUBTALAR ARTHRODESIS Right 7/26/2018    Procedure: ANKLE SUBTALAR JOINT  ARTHRODESIS AND ALL OTHER INDICATED PROCEDURES;  Surgeon: Junaid Avery DPM;  Location: [facility];  Service: Podiatry   • WISDOM TOOTH EXTRACTION           Family History   Problem Relation Age of Onset   • No Known Problems Mother    • No Known Problems Father    • No Known Problems Brother    • Cancer Paternal Grandmother    • Cancer Paternal Grandfather        No Known Allergies    Social History     Social History   • Marital status: Single     Spouse name: N/A   • Number of children: N/A   • Years of education: N/A     Occupational History   • Not on file.     Social History Main Topics   • Smoking status: Former Smoker     Types: Cigarettes   • Smokeless tobacco: Former User     Types: Chew   • Alcohol use No   • Drug use: No      Comment: hx  9 months clean   • Sexual activity: Defer     Other Topics Concern   • Not on file     Social History Narrative   • No narrative on file         Current Outpatient Prescriptions   Medication Sig Dispense Refill   • cetirizine (zyrTEC) 10 MG tablet Take 10 mg by mouth Daily.     • cyclobenzaprine (FLEXERIL) 10 MG  "tablet Take 1 tablet by mouth 3 (Three) Times a Day As Needed for Muscle Spasms. 21 tablet 0   • gabapentin (NEURONTIN) 300 MG capsule Take 300 mg by mouth 2 (Two) Times a Day As Needed.     • gabapentin (NEURONTIN) 300 MG capsule Take 1 capsule by mouth 3 (Three) Times a Day. 90 capsule 0   • ondansetron ODT (ZOFRAN ODT) 8 MG disintegrating tablet Take 1 tablet by mouth Every 8 (Eight) Hours As Needed for Nausea or Vomiting. 30 tablet 0   • oxyCODONE-acetaminophen (PERCOCET)  MG per tablet Take 1 tablet by mouth Every 4 (Four) Hours As Needed for Moderate Pain. 42 tablet 0   • triamcinolone (KENALOG) 0.1 % cream Apply  topically 3 (Three) Times a Day As Needed for Rash (eczema). 15 g 0     No current facility-administered medications for this visit.          OBJECTIVE    Pulse 87   Ht 180.3 cm (71\")   Wt 94.3 kg (208 lb)   BMI 29.01 kg/m²       Review of Systems   Constitutional: Negative.    HENT: Negative.    Respiratory: Negative.    Cardiovascular: Negative.    Musculoskeletal: Negative.    Psychiatric/Behavioral: Negative.          RLE: Incision site is well approximated.  No signs of dehiscence noted.  Improved edema and ecchymosis noted.  Negative Homans       Procedures        ASSESSMENT AND PLAN    Devaughn was seen today for follow-up and post-op.    Diagnoses and all orders for this visit:    Post-traumatic osteoarthritis of right foot  -     XR Foot 3+ View Right    - x-rays taken and reviewed  - Sutures removed.  Sterile dressing applied  - Applied nonweightbearing short leg fiberglass cast  - Patient to be nonweightbearing  - Ice and elevate at home  - All his questions were answered.  - Return to clinic in 2 weeks          This document has been electronically signed by Junaid Avery DPM on August 14, 2018 2:41 PM     8/14/2018  2:41 PM    "

## 2018-08-28 ENCOUNTER — OFFICE VISIT (OUTPATIENT)
Dept: PODIATRY | Facility: CLINIC | Age: 33
End: 2018-08-28

## 2018-08-28 VITALS — WEIGHT: 208 LBS | HEART RATE: 96 BPM | HEIGHT: 71 IN | OXYGEN SATURATION: 98 % | BODY MASS INDEX: 29.12 KG/M2

## 2018-08-28 DIAGNOSIS — M19.171 POST-TRAUMATIC OSTEOARTHRITIS OF RIGHT FOOT: Primary | ICD-10-CM

## 2018-08-28 PROCEDURE — 29445 APPL RIGID TOT CNTC LEG CAST: CPT | Performed by: PODIATRIST

## 2018-08-28 PROCEDURE — 99024 POSTOP FOLLOW-UP VISIT: CPT | Performed by: PODIATRIST

## 2018-08-28 NOTE — PROGRESS NOTES
Devaughn Hernández Michael  1985  32 y.o. male      patient presents today for post-op recheck of right foot.      Chief Complaint   Patient presents with   • Right Foot - post op recheck       History of Present Illness    Patient presents to clinic today for his third postoperative visit.  Had right subtalar joint arthrodesis date of surgery 7/26/2018.  His cast is clean, dry and intact.  He is nonweightbearing with crutches.       Past Medical History:   Diagnosis Date   • Closed displaced fracture of body of right calcaneus with routine healing    • Foot pain, right    • Post-traumatic osteoarthritis of right foot          Past Surgical History:   Procedure Laterality Date   • SUBTALAR ARTHRODESIS Right 7/26/2018    Procedure: ANKLE SUBTALAR JOINT  ARTHRODESIS AND ALL OTHER INDICATED PROCEDURES;  Surgeon: Junaid Avery DPM;  Location: [facility];  Service: Podiatry   • WISDOM TOOTH EXTRACTION           Family History   Problem Relation Age of Onset   • No Known Problems Mother    • No Known Problems Father    • No Known Problems Brother    • Cancer Paternal Grandmother    • Cancer Paternal Grandfather        No Known Allergies    Social History     Social History   • Marital status: Single     Spouse name: N/A   • Number of children: N/A   • Years of education: N/A     Occupational History   • Not on file.     Social History Main Topics   • Smoking status: Former Smoker     Types: Cigarettes   • Smokeless tobacco: Former User     Types: Chew   • Alcohol use No   • Drug use: No      Comment: hx  9 months clean   • Sexual activity: Defer     Other Topics Concern   • Not on file     Social History Narrative   • No narrative on file         Current Outpatient Prescriptions   Medication Sig Dispense Refill   • cetirizine (zyrTEC) 10 MG tablet Take 10 mg by mouth Daily.     • cyclobenzaprine (FLEXERIL) 10 MG tablet Take 1 tablet by mouth 3 (Three) Times a Day As Needed for Muscle Spasms. 21 tablet 0   • gabapentin  "(NEURONTIN) 300 MG capsule Take 300 mg by mouth 2 (Two) Times a Day As Needed.     • gabapentin (NEURONTIN) 300 MG capsule Take 1 capsule by mouth 3 (Three) Times a Day. 90 capsule 0   • ondansetron ODT (ZOFRAN ODT) 8 MG disintegrating tablet Take 1 tablet by mouth Every 8 (Eight) Hours As Needed for Nausea or Vomiting. 30 tablet 0   • oxyCODONE-acetaminophen (PERCOCET)  MG per tablet Take 1 tablet by mouth Every 4 (Four) Hours As Needed for Moderate Pain. 42 tablet 0   • triamcinolone (KENALOG) 0.1 % cream Apply  topically 3 (Three) Times a Day As Needed for Rash (eczema). 15 g 0     No current facility-administered medications for this visit.          OBJECTIVE    Pulse 96   Ht 180.3 cm (71\")   Wt 94.3 kg (208 lb)   SpO2 98%   BMI 29.01 kg/m²       Review of Systems   Constitutional: Negative.    HENT: Negative.    Respiratory: Negative.    Cardiovascular: Negative.    Musculoskeletal: Negative.    Skin: Negative.    Neurological: Negative.    Psychiatric/Behavioral: Negative.          RLE: Incision site is healed .  No signs of dehiscence noted.  Negative Homans       Procedures        ASSESSMENT AND PLAN    Devaughn was seen today for post op recheck.    Diagnoses and all orders for this visit:    Post-traumatic osteoarthritis of right foot  -     XR Foot 3+ View Right    - x-rays taken and reviewed  - Applied nonweightbearing short leg fiberglass cast  - Ice and elevate at home  - All his questions were answered.  - Return to clinic in 2 weeks          This document has been electronically signed by Junaid Avery DPM on August 28, 2018 1:16 PM     8/28/2018  1:16 PM    "

## 2018-08-31 ENCOUNTER — DOCUMENTATION (OUTPATIENT)
Dept: PODIATRY | Facility: CLINIC | Age: 33
End: 2018-08-31

## 2018-09-11 ENCOUNTER — OFFICE VISIT (OUTPATIENT)
Dept: PODIATRY | Facility: CLINIC | Age: 33
End: 2018-09-11

## 2018-09-11 VITALS — WEIGHT: 208 LBS | BODY MASS INDEX: 29.12 KG/M2 | HEIGHT: 71 IN | OXYGEN SATURATION: 98 % | HEART RATE: 114 BPM

## 2018-09-11 DIAGNOSIS — M19.171 POST-TRAUMATIC OSTEOARTHRITIS OF RIGHT FOOT: Primary | ICD-10-CM

## 2018-09-11 PROCEDURE — 99024 POSTOP FOLLOW-UP VISIT: CPT | Performed by: PODIATRIST

## 2018-09-11 NOTE — PROGRESS NOTES
Devaughn Hernández Michael  1985  32 y.o. male      Patient presents today for post-op recheck of his right foot.      Chief Complaint   Patient presents with   • Right Foot - postop recheck       History of Present Illness    Patient presents to clinic today for his  postoperative visit.  Had right subtalar joint arthrodesis date of surgery 7/26/2018.  His cast is clean, dry and intact.  He is nonweightbearing with a knee scooter. He denies pain.       Past Medical History:   Diagnosis Date   • Closed displaced fracture of body of right calcaneus with routine healing    • Foot pain, right    • Post-traumatic osteoarthritis of right foot          Past Surgical History:   Procedure Laterality Date   • FOOT SURGERY     • SUBTALAR ARTHRODESIS Right 7/26/2018    Procedure: ANKLE SUBTALAR JOINT  ARTHRODESIS AND ALL OTHER INDICATED PROCEDURES;  Surgeon: Junaid Avery DPM;  Location: [facility];  Service: Podiatry   • WISDOM TOOTH EXTRACTION           Family History   Problem Relation Age of Onset   • No Known Problems Mother    • No Known Problems Father    • No Known Problems Brother    • Cancer Paternal Grandmother    • Cancer Paternal Grandfather        No Known Allergies    Social History     Social History   • Marital status: Single     Spouse name: N/A   • Number of children: N/A   • Years of education: N/A     Occupational History   • Not on file.     Social History Main Topics   • Smoking status: Former Smoker     Types: Cigarettes   • Smokeless tobacco: Former User     Types: Chew   • Alcohol use No   • Drug use: No      Comment: hx  9 months clean   • Sexual activity: Defer     Other Topics Concern   • Not on file     Social History Narrative   • No narrative on file         Current Outpatient Prescriptions   Medication Sig Dispense Refill   • cetirizine (zyrTEC) 10 MG tablet Take 10 mg by mouth Daily.     • cyclobenzaprine (FLEXERIL) 10 MG tablet Take 1 tablet by mouth 3 (Three) Times a Day As Needed for Muscle  "Spasms. 21 tablet 0   • gabapentin (NEURONTIN) 300 MG capsule Take 300 mg by mouth 2 (Two) Times a Day As Needed.     • gabapentin (NEURONTIN) 300 MG capsule Take 1 capsule by mouth 3 (Three) Times a Day. 90 capsule 0   • ondansetron ODT (ZOFRAN ODT) 8 MG disintegrating tablet Take 1 tablet by mouth Every 8 (Eight) Hours As Needed for Nausea or Vomiting. 30 tablet 0   • oxyCODONE-acetaminophen (PERCOCET)  MG per tablet Take 1 tablet by mouth Every 4 (Four) Hours As Needed for Moderate Pain. 42 tablet 0   • triamcinolone (KENALOG) 0.1 % cream Apply  topically 3 (Three) Times a Day As Needed for Rash (eczema). 15 g 0     No current facility-administered medications for this visit.          OBJECTIVE    Pulse 114   Ht 180.3 cm (71\")   Wt 94.3 kg (208 lb)   SpO2 98%   BMI 29.01 kg/m²       Review of Systems   Constitutional: Negative.    HENT: Negative.    Respiratory: Negative.    Cardiovascular: Negative.    Gastrointestinal: Negative.    Genitourinary: Negative.    Musculoskeletal: Negative.    Neurological: Negative.    Psychiatric/Behavioral: Negative.      Physical Exam   Constitutional: He is oriented to person, place, and time. He appears well-developed and well-nourished.   HENT:   Head: Normocephalic and atraumatic.   Nose: Nose normal.   Pulmonary/Chest: Effort normal. No respiratory distress. He has no wheezes.   Neurological: He is alert and oriented to person, place, and time.   Skin: He is not diaphoretic.   Psychiatric: He has a normal mood and affect. His behavior is normal.       RLE: Incision site is healed .  No signs of dehiscence noted.  Negative Homans       Procedures        ASSESSMENT AND PLAN    Devaughn was seen today for postop recheck.    Diagnoses and all orders for this visit:    Post-traumatic osteoarthritis of right foot  -     XR Foot 3+ View Right    - x-rays taken and reviewed  - dispensed cam boot. NWB  - Ice and elevate at home  - All his questions were answered.  - Return " to clinic in 2 weeks          This document has been electronically signed by Junaid Avery DPM on September 11, 2018 12:47 PM     9/11/2018  12:47 PM

## 2018-09-19 ENCOUNTER — DOCUMENTATION (OUTPATIENT)
Dept: PODIATRY | Facility: CLINIC | Age: 33
End: 2018-09-19

## 2018-09-25 ENCOUNTER — OFFICE VISIT (OUTPATIENT)
Dept: PODIATRY | Facility: CLINIC | Age: 33
End: 2018-09-25

## 2018-09-25 VITALS — HEART RATE: 83 BPM | WEIGHT: 208 LBS | OXYGEN SATURATION: 98 % | HEIGHT: 71 IN | BODY MASS INDEX: 29.12 KG/M2

## 2018-09-25 DIAGNOSIS — S92.011S CLOSED DISPLACED FRACTURE OF BODY OF RIGHT CALCANEUS, SEQUELA: ICD-10-CM

## 2018-09-25 DIAGNOSIS — M19.171 POST-TRAUMATIC OSTEOARTHRITIS OF RIGHT FOOT: Primary | ICD-10-CM

## 2018-09-25 PROCEDURE — 99024 POSTOP FOLLOW-UP VISIT: CPT | Performed by: PODIATRIST

## 2018-09-25 RX ORDER — ACYCLOVIR 400 MG/1
TABLET ORAL
Refills: 0 | COMMUNITY
Start: 2018-07-03

## 2018-09-25 RX ORDER — RIVAROXABAN 10 MG/1
TABLET, FILM COATED ORAL DAILY
Refills: 0 | COMMUNITY
Start: 2018-07-27

## 2018-09-25 NOTE — PROGRESS NOTES
Devaughn Hernández Michael  1985  33 y.o. male      Patient presents today for post-op recheck of his right foot.      Chief Complaint   Patient presents with   • Right Foot - post op recheck       History of Present Illness    Patient presents to clinic today for his  postoperative visit.  Had right subtalar joint arthrodesis date of surgery 7/26/2018.  He is nonweightbearing in a cam boot with a knee scooter.  States that he has slight pain today secondary to a fall in the shower a few days ago.      Past Medical History:   Diagnosis Date   • Closed displaced fracture of body of right calcaneus with routine healing    • Foot pain, right    • Post-traumatic osteoarthritis of right foot          Past Surgical History:   Procedure Laterality Date   • FOOT SURGERY     • SUBTALAR ARTHRODESIS Right 7/26/2018    Procedure: ANKLE SUBTALAR JOINT  ARTHRODESIS AND ALL OTHER INDICATED PROCEDURES;  Surgeon: Junaid Avery DPM;  Location: [facility];  Service: Podiatry   • WISDOM TOOTH EXTRACTION           Family History   Problem Relation Age of Onset   • No Known Problems Mother    • No Known Problems Father    • No Known Problems Brother    • Cancer Paternal Grandmother    • Cancer Paternal Grandfather        No Known Allergies    Social History     Social History   • Marital status: Single     Spouse name: N/A   • Number of children: N/A   • Years of education: N/A     Occupational History   • Not on file.     Social History Main Topics   • Smoking status: Former Smoker     Types: Cigarettes   • Smokeless tobacco: Former User     Types: Chew   • Alcohol use No   • Drug use: No      Comment: hx  9 months clean   • Sexual activity: Defer     Other Topics Concern   • Not on file     Social History Narrative   • No narrative on file         Current Outpatient Prescriptions   Medication Sig Dispense Refill   • cetirizine (zyrTEC) 10 MG tablet Take 10 mg by mouth Daily.     • cyclobenzaprine (FLEXERIL) 10 MG tablet Take 1 tablet  "by mouth 3 (Three) Times a Day As Needed for Muscle Spasms. 21 tablet 0   • gabapentin (NEURONTIN) 300 MG capsule Take 300 mg by mouth 2 (Two) Times a Day As Needed.     • gabapentin (NEURONTIN) 300 MG capsule Take 1 capsule by mouth 3 (Three) Times a Day. 90 capsule 0   • ondansetron ODT (ZOFRAN ODT) 8 MG disintegrating tablet Take 1 tablet by mouth Every 8 (Eight) Hours As Needed for Nausea or Vomiting. 30 tablet 0   • oxyCODONE-acetaminophen (PERCOCET)  MG per tablet Take 1 tablet by mouth Every 4 (Four) Hours As Needed for Moderate Pain. 42 tablet 0   • triamcinolone (KENALOG) 0.1 % cream Apply  topically 3 (Three) Times a Day As Needed for Rash (eczema). 15 g 0   • acyclovir (ZOVIRAX) 400 MG tablet TK 1 T PO TID FOR THE FIRST 3 DAYS. THEN TK 1 T PO BID FOR 5 DAYS  0   • XARELTO 10 MG tablet Take  by mouth Daily.  0     No current facility-administered medications for this visit.          OBJECTIVE    Pulse 83   Ht 180.3 cm (71\")   Wt 94.3 kg (208 lb)   SpO2 98%   BMI 29.01 kg/m²       Review of Systems   Constitutional: Negative.    HENT: Negative.    Eyes: Negative.    Cardiovascular: Negative.    Gastrointestinal: Negative.    Musculoskeletal: Negative.    Skin: Negative.    Neurological: Negative.    Psychiatric/Behavioral: Negative.      Physical Exam   Constitutional: He is oriented to person, place, and time. He appears well-developed and well-nourished. No distress.   HENT:   Head: Normocephalic and atraumatic.   Nose: Nose normal.   Eyes: Pupils are equal, round, and reactive to light. Conjunctivae and EOM are normal.   Pulmonary/Chest: Effort normal. No respiratory distress. He has no wheezes.   Neurological: He is alert and oriented to person, place, and time.   Psychiatric: He has a normal mood and affect. His behavior is normal.       RLE: Incision site is healed .  No signs of dehiscence noted.  Improved edema.  Ankle joint range of motion is decreased without pain.  Negative Homans   "     Procedures        ASSESSMENT AND PLAN    Devaughn was seen today for post op recheck.    Diagnoses and all orders for this visit:    Post-traumatic osteoarthritis of right foot  -     XR Foot 3+ View Right    Closed displaced fracture of body of right calcaneus, sequela  -     XR Foot 3+ View Right    - x-rays taken and reviewed  - Transition to weightbearing as tolerated in cam boot  - All his questions were answered.  - Return to clinic in 2 weeks          This document has been electronically signed by Junaid Avery DPM on September 25, 2018 12:15 PM     9/25/2018  12:15 PM

## 2018-10-10 ENCOUNTER — TELEPHONE (OUTPATIENT)
Dept: PODIATRY | Facility: CLINIC | Age: 33
End: 2018-10-10

## 2018-10-10 NOTE — TELEPHONE ENCOUNTER
ISAAC LEONARD CALLED THIS MORNING SAYING THAT HE BUSTED HIS BOOT AND WANTED A NEW ONE.    CAYLA WAS HERE AND SHE GOT HIS BOOT AND PAPERWORK READY AND LEFT IT WITH ME BECAUSE SHE WAS CALLED TO SURGERY.    HE PICKED UP HIS BOOT AND SAID THAT HE HAS BEEN WALKING ON IT FOR TWO DAYS AND HE IS IN SO MUCH PAIN.      THEN HE LEFT.      JUST WANTED TO LET YOU KNOW

## 2018-10-17 ENCOUNTER — OFFICE VISIT (OUTPATIENT)
Dept: PODIATRY | Facility: CLINIC | Age: 33
End: 2018-10-17

## 2018-10-17 VITALS — HEIGHT: 71 IN | HEART RATE: 84 BPM | WEIGHT: 208 LBS | OXYGEN SATURATION: 99 % | BODY MASS INDEX: 29.12 KG/M2

## 2018-10-17 DIAGNOSIS — M19.171 POST-TRAUMATIC OSTEOARTHRITIS OF RIGHT FOOT: Primary | ICD-10-CM

## 2018-10-17 PROCEDURE — 99024 POSTOP FOLLOW-UP VISIT: CPT | Performed by: PODIATRIST

## 2018-10-17 NOTE — PROGRESS NOTES
Devaughn Hernández Igorleon  1985  33 y.o. male      Patient presents today for post-op recheck of his right foot with repeat xrays.    Chief Complaint   Patient presents with   • Right Foot - post op recheck       History of Present Illness    Patient presents to clinic today for his  postoperative visit.  Had right subtalar joint arthrodesis date of surgery 7/26/2018.  He is wbat in cam boot.  States that he spent a few days walking in tennis shoes which made his ankle sore.  Overall he is doing very well.      Past Medical History:   Diagnosis Date   • Closed displaced fracture of body of right calcaneus with routine healing    • Foot pain, right    • Post-traumatic osteoarthritis of right foot          Past Surgical History:   Procedure Laterality Date   • FOOT SURGERY     • SUBTALAR ARTHRODESIS Right 7/26/2018    Procedure: ANKLE SUBTALAR JOINT  ARTHRODESIS AND ALL OTHER INDICATED PROCEDURES;  Surgeon: Junaid Avery DPM;  Location: [facility];  Service: Podiatry   • WISDOM TOOTH EXTRACTION           Family History   Problem Relation Age of Onset   • No Known Problems Mother    • No Known Problems Father    • No Known Problems Brother    • Cancer Paternal Grandmother    • Cancer Paternal Grandfather        No Known Allergies    Social History     Social History   • Marital status: Single     Spouse name: N/A   • Number of children: N/A   • Years of education: N/A     Occupational History   • Not on file.     Social History Main Topics   • Smoking status: Former Smoker     Types: Cigarettes   • Smokeless tobacco: Former User     Types: Chew   • Alcohol use No   • Drug use: No      Comment: hx  9 months clean   • Sexual activity: Defer     Other Topics Concern   • Not on file     Social History Narrative   • No narrative on file         Current Outpatient Prescriptions   Medication Sig Dispense Refill   • acyclovir (ZOVIRAX) 400 MG tablet TK 1 T PO TID FOR THE FIRST 3 DAYS. THEN TK 1 T PO BID FOR 5 DAYS  0   •  "cetirizine (zyrTEC) 10 MG tablet Take 10 mg by mouth Daily.     • cyclobenzaprine (FLEXERIL) 10 MG tablet Take 1 tablet by mouth 3 (Three) Times a Day As Needed for Muscle Spasms. 21 tablet 0   • gabapentin (NEURONTIN) 300 MG capsule Take 300 mg by mouth 2 (Two) Times a Day As Needed.     • gabapentin (NEURONTIN) 300 MG capsule Take 1 capsule by mouth 3 (Three) Times a Day. 90 capsule 0   • ondansetron ODT (ZOFRAN ODT) 8 MG disintegrating tablet Take 1 tablet by mouth Every 8 (Eight) Hours As Needed for Nausea or Vomiting. 30 tablet 0   • oxyCODONE-acetaminophen (PERCOCET)  MG per tablet Take 1 tablet by mouth Every 4 (Four) Hours As Needed for Moderate Pain. 42 tablet 0   • triamcinolone (KENALOG) 0.1 % cream Apply  topically 3 (Three) Times a Day As Needed for Rash (eczema). 15 g 0   • XARELTO 10 MG tablet Take  by mouth Daily.  0     No current facility-administered medications for this visit.          OBJECTIVE    Pulse 84   Ht 180.3 cm (71\")   Wt 94.3 kg (208 lb)   SpO2 99%   BMI 29.01 kg/m²       Review of Systems   Constitutional: Negative.    HENT: Negative.    Eyes: Negative.    Respiratory: Negative.    Cardiovascular: Negative.    Gastrointestinal: Negative.    Musculoskeletal: Negative.    Neurological: Negative.    Psychiatric/Behavioral: Negative.      Physical Exam   Constitutional: He is oriented to person, place, and time. He appears well-developed. No distress.   HENT:   Head: Normocephalic and atraumatic.   Nose: Nose normal.   Eyes: Pupils are equal, round, and reactive to light. Conjunctivae and EOM are normal.   Pulmonary/Chest: Effort normal. No respiratory distress.   Neurological: He is alert and oriented to person, place, and time.   Psychiatric: He has a normal mood and affect. His behavior is normal.       RLE: Incision site is healed .  No signs of dehiscence noted.  Improved edema.  Ankle joint range of motion is decreased without pain.  Muscle strength is 5 out of 5.  " Negative Homans       Procedures        ASSESSMENT AND PLAN    Devaughn was seen today for post op recheck.    Diagnoses and all orders for this visit:    Post-traumatic osteoarthritis of right foot  -     XR Foot 3+ View Right    - patient is doing well postoperatively  - x-rays taken and reviewed  - Continue weightbearing in cam boot  - Rx for physical therapy  - All his questions were answered.  - Return to clinic in 2 weeks          This document has been electronically signed by Liya Mace on October 17, 2018 9:28 AM     10/17/2018  9:28 AM

## 2018-10-25 ENCOUNTER — TELEPHONE (OUTPATIENT)
Dept: PODIATRY | Facility: CLINIC | Age: 33
End: 2018-10-25

## 2018-10-25 ENCOUNTER — DOCUMENTATION (OUTPATIENT)
Dept: PODIATRY | Facility: CLINIC | Age: 33
End: 2018-10-25

## 2018-11-28 ENCOUNTER — OFFICE VISIT (OUTPATIENT)
Dept: PODIATRY | Facility: CLINIC | Age: 33
End: 2018-11-28

## 2018-11-28 VITALS — WEIGHT: 208 LBS | HEART RATE: 91 BPM | BODY MASS INDEX: 29.12 KG/M2 | HEIGHT: 71 IN | OXYGEN SATURATION: 100 %

## 2018-11-28 DIAGNOSIS — M19.171 POST-TRAUMATIC OSTEOARTHRITIS OF RIGHT FOOT: Primary | ICD-10-CM

## 2018-11-28 PROCEDURE — 99213 OFFICE O/P EST LOW 20 MIN: CPT | Performed by: PODIATRIST

## 2018-11-28 NOTE — PROGRESS NOTES
Devaughn Hernández Michael  1985  33 y.o. male   Not diabetic     Patient presents today for recheck of his right foot with repeat xrays.    Chief Complaint   Patient presents with   • Right Foot - Follow-up       History of Present Illness    Patient presents to clinic today for follow up. He has missed his last couple of appointments. Patient is currently ambulating in regular shoe gear. He has soreness with prolonged ambulation. He states that his pain  has improved when compared to before surgery. He had right subtalar joint arthrodesis date of surgery 7/26/2018. Patient also states that he is moving to [place] in 2 weeks. He wishes to continue PT there.     Past Medical History:   Diagnosis Date   • Closed displaced fracture of body of right calcaneus with routine healing    • Foot pain, right    • Post-traumatic osteoarthritis of right foot          Past Surgical History:   Procedure Laterality Date   • FOOT SURGERY     • WISDOM TOOTH EXTRACTION           Family History   Problem Relation Age of Onset   • No Known Problems Mother    • No Known Problems Father    • No Known Problems Brother    • Cancer Paternal Grandmother    • Cancer Paternal Grandfather        No Known Allergies    Social History     Socioeconomic History   • Marital status: Single     Spouse name: Not on file   • Number of children: Not on file   • Years of education: Not on file   • Highest education level: Not on file   Social Needs   • Financial resource strain: Not on file   • Food insecurity - worry: Not on file   • Food insecurity - inability: Not on file   • Transportation needs - medical: Not on file   • Transportation needs - non-medical: Not on file   Occupational History   • Not on file   Tobacco Use   • Smoking status: Former Smoker     Types: Cigarettes   • Smokeless tobacco: Former User     Types: Chew   Substance and Sexual Activity   • Alcohol use: No   • Drug use: No     Comment: hx  9 months clean   • Sexual activity: Defer  "  Other Topics Concern   • Not on file   Social History Narrative   • Not on file         Current Outpatient Medications   Medication Sig Dispense Refill   • acyclovir (ZOVIRAX) 400 MG tablet TK 1 T PO TID FOR THE FIRST 3 DAYS. THEN TK 1 T PO BID FOR 5 DAYS  0   • cetirizine (zyrTEC) 10 MG tablet Take 10 mg by mouth Daily.     • cyclobenzaprine (FLEXERIL) 10 MG tablet Take 1 tablet by mouth 3 (Three) Times a Day As Needed for Muscle Spasms. 21 tablet 0   • gabapentin (NEURONTIN) 300 MG capsule Take 300 mg by mouth 2 (Two) Times a Day As Needed.     • gabapentin (NEURONTIN) 300 MG capsule Take 1 capsule by mouth 3 (Three) Times a Day. 90 capsule 0   • ondansetron ODT (ZOFRAN ODT) 8 MG disintegrating tablet Take 1 tablet by mouth Every 8 (Eight) Hours As Needed for Nausea or Vomiting. 30 tablet 0   • oxyCODONE-acetaminophen (PERCOCET)  MG per tablet Take 1 tablet by mouth Every 4 (Four) Hours As Needed for Moderate Pain. 42 tablet 0   • triamcinolone (KENALOG) 0.1 % cream Apply  topically 3 (Three) Times a Day As Needed for Rash (eczema). 15 g 0   • XARELTO 10 MG tablet Take  by mouth Daily.  0     No current facility-administered medications for this visit.          OBJECTIVE    Pulse 91   Ht 180.3 cm (71\")   Wt 94.3 kg (208 lb)   SpO2 100%   BMI 29.01 kg/m²       Review of Systems   Constitutional: Negative.    HENT: Negative.    Eyes: Negative.    Respiratory: Negative.    Cardiovascular: Negative.    Gastrointestinal: Negative.    Skin: Negative.    Neurological: Negative.    Psychiatric/Behavioral: Negative.      Physical Exam   Constitutional: He is oriented to person, place, and time. He appears well-developed and well-nourished. No distress.   HENT:   Head: Normocephalic and atraumatic.   Nose: Nose normal.   Eyes: Conjunctivae and EOM are normal. Pupils are equal, round, and reactive to light.   Pulmonary/Chest: Effort normal. No respiratory distress.   Neurological: He is alert and oriented to " person, place, and time. He displays normal reflexes.   Psychiatric: He has a normal mood and affect. His behavior is normal.   Vitals reviewed.      RLE: Incision site is healed .  Minimal edema.  Ankle joint range of motion is WNL without pain. Absent STJ  ROM.  Muscle strength is 5 out of 5.  Negative Homans       Procedures        ASSESSMENT AND PLAN    Devaughn was seen today for follow-up.    Diagnoses and all orders for this visit:    Post-traumatic osteoarthritis of right foot    - patient is doing well postoperatively  - x-rays taken and reviewed  - Continue physical therapy  - All his questions were answered.  - Return to clinic as needed           This document has been electronically signed by Junaid Avery DPM on November 28, 2018 6:36 PM     11/28/2018  6:36 PM

## 2021-05-04 NOTE — TELEPHONE ENCOUNTER
Can you give me his work status please.      
Kvng,  I left this woman a voice mail. If she calls back and you get the call, Dr Avery was wanting to know what type of work this gentleman does and is he out of work currently.?.  Thank you  soledad    
What does he do for a living? Is he currently working?   
Workers comp called on behalf of patient and was needing office note from today along with work status.  Also needs work status from initial visit on 3/29.  also would like to speak to you about surgery because she will need a lot of information in order to get approval for surgery told her I would have you to call because you was probably the one she needs to talk to.  Her name is Deborah and phone number is [phone] and Fax number [phone]  
no

## (undated) DEVICE — UNDRPD BREATH 23X36 BG/10

## (undated) DEVICE — NDL HYPO ECLPS SFTY 25G 1 1/2IN

## (undated) DEVICE — GOWN,PREVENTION PLUS,XLNG/XXLARGE,STRL: Brand: MEDLINE

## (undated) DEVICE — CVR FLUOROSCOPE C/ARM W/TP 36X28IN

## (undated) DEVICE — SUT VIC 2/0 SH 27IN

## (undated) DEVICE — GLV SURG TRIUMPH ORTHO W/ALOE PF LTX 7.5 STRL

## (undated) DEVICE — PK POD 60

## (undated) DEVICE — STERILE POLYISOPRENE POWDER-FREE SURGICAL GLOVES WITH EMOLLIENT COATING: Brand: PROTEXIS

## (undated) DEVICE — GOWN,AURORA,NOREINF,RAGLAN,XL,STERILE: Brand: MEDLINE

## (undated) DEVICE — THREADED GUIDE WIRE: Brand: FIXOS

## (undated) DEVICE — BNDG ELAS ELITE V/CLOSE 4IN 5YD LF STRL

## (undated) DEVICE — DRSNG GZ CURAD XEROFORM NONADHS 5X9IN STRL

## (undated) DEVICE — SOL IRR NACL 0.9PCT BT 1000ML

## (undated) DEVICE — SUT ETHLN 4/0 FS2 18IN 662H

## (undated) DEVICE — ANTIBACTERIAL UNDYED BRAIDED (POLYGLACTIN 910), SYNTHETIC ABSORBABLE SUTURE: Brand: COATED VICRYL

## (undated) DEVICE — GLV SURG TRIUMPH ORTHO W/ALOE PF LTX 6.5 STRL

## (undated) DEVICE — DISPOSABLE TOURNIQUET CUFF SINGLE BLADDER, DUAL PORT AND QUICK CONNECT CONNECTOR: Brand: COLOR CUFF

## (undated) DEVICE — STERILE POLYISOPRENE POWDER-FREE SURGICAL GLOVES: Brand: PROTEXIS

## (undated) DEVICE — BONE BRM TENSIX 5CC
Type: IMPLANTABLE DEVICE | Status: NON-FUNCTIONAL
Removed: 2018-07-26

## (undated) DEVICE — LIGHT HANDLE: Brand: DEVON

## (undated) DEVICE — PAD UNDERCAST WYTEX 4IN 4YD LF STRL

## (undated) DEVICE — PRECISION THIN (9.0 X 0.38 X 31.0MM)

## (undated) DEVICE — BNDG ELAS CO-FLEX SLF ADHR 3IN5YD LF2 STRL

## (undated) DEVICE — BNDG ELAS ELITE V/CLOSE 6IN 5YD LF STRL

## (undated) DEVICE — CANNULATED DRILL: Brand: FIXOS

## (undated) DEVICE — Device

## (undated) DEVICE — SPNG GZ WOVN 4X4IN 12PLY 10/BX STRL

## (undated) DEVICE — GLV SURG TRIUMPH PF LTX 6.5 STRL

## (undated) DEVICE — SUT ETHLN 3-0 FS118IN 663H

## (undated) DEVICE — SPLNT PLSTR CAST 5IN

## (undated) DEVICE — PAD UNDERCAST WYTEX 6IN 4YD LF STRL

## (undated) DEVICE — GLV SURG SENSICARE GREEN W/ALOE PF LF 8 STRL

## (undated) DEVICE — APPL CHLORAPREP W/TINT 26ML ORNG

## (undated) DEVICE — GLV SURG TRIUMPH LT PF LTX 6.5 STRL

## (undated) DEVICE — GLV SURG SENSICARE GREEN W/ALOE PF LF 6.5 STRL